# Patient Record
Sex: MALE | Race: WHITE | NOT HISPANIC OR LATINO | Employment: FULL TIME | URBAN - METROPOLITAN AREA
[De-identification: names, ages, dates, MRNs, and addresses within clinical notes are randomized per-mention and may not be internally consistent; named-entity substitution may affect disease eponyms.]

---

## 2017-03-13 RX ORDER — CLINDAMYCIN PHOSPHATE 10 MG/ML
SOLUTION TOPICAL 2 TIMES DAILY
OUTPATIENT
Start: 2017-03-13

## 2019-01-17 ENCOUNTER — TELEPHONE (OUTPATIENT)
Dept: ORTHOPEDICS | Facility: CLINIC | Age: 39
End: 2019-01-17

## 2019-01-22 DIAGNOSIS — M25.512 LEFT SHOULDER PAIN, UNSPECIFIED CHRONICITY: Primary | ICD-10-CM

## 2019-01-23 ENCOUNTER — OFFICE VISIT (OUTPATIENT)
Dept: ORTHOPEDICS | Facility: CLINIC | Age: 39
End: 2019-01-23
Payer: COMMERCIAL

## 2019-01-23 ENCOUNTER — HOSPITAL ENCOUNTER (OUTPATIENT)
Dept: RADIOLOGY | Facility: HOSPITAL | Age: 39
Discharge: HOME | End: 2019-01-23
Attending: ORTHOPAEDIC SURGERY
Payer: COMMERCIAL

## 2019-01-23 VITALS
HEART RATE: 65 BPM | SYSTOLIC BLOOD PRESSURE: 122 MMHG | DIASTOLIC BLOOD PRESSURE: 81 MMHG | BODY MASS INDEX: 25.67 KG/M2 | HEIGHT: 74 IN | WEIGHT: 200 LBS

## 2019-01-23 DIAGNOSIS — M25.512 LEFT SHOULDER PAIN, UNSPECIFIED CHRONICITY: ICD-10-CM

## 2019-01-23 DIAGNOSIS — S43.432A GLENOID LABRAL TEAR, LEFT, INITIAL ENCOUNTER: Primary | ICD-10-CM

## 2019-01-23 DIAGNOSIS — M25.512 LEFT SHOULDER PAIN, UNSPECIFIED CHRONICITY: Primary | ICD-10-CM

## 2019-01-23 PROCEDURE — 73030 XR SHOULDER TRAUMA 3 VIEW LEFT: ICD-10-PCS | Mod: 26,LT,, | Performed by: RADIOLOGY

## 2019-01-23 PROCEDURE — 99999 PR PBB SHADOW E&M-EST. PATIENT-LVL III: ICD-10-PCS | Mod: PBBFAC,,, | Performed by: ORTHOPAEDIC SURGERY

## 2019-01-23 PROCEDURE — 3008F BODY MASS INDEX DOCD: CPT | Mod: CPTII,S$GLB,, | Performed by: ORTHOPAEDIC SURGERY

## 2019-01-23 PROCEDURE — 3008F PR BODY MASS INDEX (BMI) DOCUMENTED: ICD-10-PCS | Mod: CPTII,S$GLB,, | Performed by: ORTHOPAEDIC SURGERY

## 2019-01-23 PROCEDURE — 99999 PR PBB SHADOW E&M-EST. PATIENT-LVL III: CPT | Mod: PBBFAC,,, | Performed by: ORTHOPAEDIC SURGERY

## 2019-01-23 PROCEDURE — 99203 PR OFFICE/OUTPT VISIT, NEW, LEVL III, 30-44 MIN: ICD-10-PCS | Mod: S$GLB,,, | Performed by: ORTHOPAEDIC SURGERY

## 2019-01-23 PROCEDURE — 73030 X-RAY EXAM OF SHOULDER: CPT | Mod: TC,PO,LT

## 2019-01-23 PROCEDURE — 73030 X-RAY EXAM OF SHOULDER: CPT | Mod: 26,LT,, | Performed by: RADIOLOGY

## 2019-01-23 PROCEDURE — 99203 OFFICE O/P NEW LOW 30 MIN: CPT | Mod: S$GLB,,, | Performed by: ORTHOPAEDIC SURGERY

## 2019-01-23 NOTE — PROGRESS NOTES
Past Medical History:   Diagnosis Date    Acne     GERD (gastroesophageal reflux disease)     Hypertension        Past Surgical History:   Procedure Laterality Date    BREAST BIOPSY      CERVICAL CONIZATION   W/ LASER      infertility surgery      SKIN SURGERY  1984    mass behind or in right EOC removed benign    TONSILLECTOMY         Current Outpatient Medications   Medication Sig    clindamycin (CLEOCIN T) 1 % Swab APPLY TO AFFECTED AREA TWICE A DAY    clindamycin (CLEOCIN T) 1 % Swab APPLY TO AFFECTED AREA TWICE A DAY     No current facility-administered medications for this visit.        Review of patient's allergies indicates:  No Known Allergies    Family History   Problem Relation Age of Onset    Heart disease Mother 55        CABG    Hyperlipidemia Mother     Hypertension Mother     Hyperlipidemia Father     Heart disease Father 63        stent    Heart disease Paternal Uncle     Cancer Maternal Grandmother     Diabetes Maternal Grandmother        Social History     Socioeconomic History    Marital status:      Spouse name: Not on file    Number of children: Not on file    Years of education: Not on file    Highest education level: Not on file   Social Needs    Financial resource strain: Not on file    Food insecurity - worry: Not on file    Food insecurity - inability: Not on file    Transportation needs - medical: Not on file    Transportation needs - non-medical: Not on file   Occupational History    Not on file   Tobacco Use    Smoking status: Never Smoker    Smokeless tobacco: Never Used   Substance and Sexual Activity    Alcohol use: Yes     Alcohol/week: 4.8 oz     Types: 4 Glasses of wine, 4 Cans of beer per week    Drug use: No    Sexual activity: Yes     Partners: Female   Other Topics Concern    Not on file   Social History Narrative    Not on file       Chief Complaint:   Chief Complaint   Patient presents with    Left Shoulder - Pain       History of  present illness:  This is a 38-year-old male seen for years of left shoulder pain.  I saw him back in 2013 for his right shoulder.  Patient states that he has had problems and had to modify activity for multiple years.  Thinks he hurt it doing some weight lifting.  Hurts with pushups and overhead lifts.  Pain with extension and rotation. Pain reaching across the body.  Pain is a 5/10.    Answers for HPI/ROS submitted by the patient on 1/22/2019   Arm pain  activity change: No  unexpected weight change: No  neck pain: No  hearing loss: No  rhinorrhea: No  trouble swallowing: No  eye discharge: No  visual disturbance: No  chest tightness: No  wheezing: No  chest pain: No  palpitations: No  blood in stool: No  constipation: No  vomiting: No  diarrhea: No  polydipsia: No  polyuria: No  difficulty urinating: No  urgency: No  hematuria: No  joint swelling: No  arthralgias: Yes  headaches: No  weakness: No  confusion: No  dysphoric mood: No  appetite change : No  sleep disturbance: No  IMMUNOCOMPROMISED: No  nervous/ anxious: No  rash: No  eye redness: No  eye pain: No  ear pain: No  tinnitus: No  sinus pressure : Yes  nosebleeds: No  enviro allergies: No  food allergies: No  cough: No  shortness of breath: No  sweating: No  frequency: No  nausea: No  dizziness: No  numbness: No  seizures: No  myalgia: Yes  back pain: Yes  Pain Chronicity: chronic  History of trauma: No  Onset: more than 1 year ago  Frequency: constantly  Progression since onset: gradually worsening  Injury mechanism: twisting  injury location: on the field  pain- numeric: 6/10  pain location: left shoulder  pain quality: aching, dull, sharp, generalized  Radiating Pain: Yes  If your pain is radiating, to what part of the body?: left arm  Aggravating factors: activity, bearing weight, exercise, extension, flexion, lifting, rotation  fever: No  inability to bear weight: Yes  itching: No  joint locking: No  limited range of motion: Yes  stiffness:  Yes  tingling: No  Treatments tried: exercise, movement  physical therapy: ineffective  Improvement on treatment: no relief        Physical Examination:    Vital Signs:    Vitals:    01/23/19 0759   BP: 122/81   Pulse: 65       Body mass index is 25.68 kg/m².    This a well-developed, well nourished patient in no acute distress.  They are alert and oriented and cooperative to examination.  Pt. walks without an antalgic gait.      Examination of the left shoulder shows no rashes or erythema. There are no masses, ecchymosis, or atrophy. The patient has full range of motion in forward flexion, external rotation, and internal rotation to the mid T-spine. The patient has moderately positive impingement signs.  Equivocal Whitley's test. - Speeds test. Nontender to palpation over a.c. joint. Normal stability anteriorly, posteriorly, and negative sulcus sign. Passive range of motion: Forward flexion of 180°, external rotation at 90° of 90°, internal rotation of 50°, and external rotation at 0° of 50°. 2+ radial pulse. Intact axillary, radial, median and ulnar sensation. 5 out of 5 resisted forward flexion, external rotation, and negative lift off test.    Examination of the right shoulder shows no rashes or erythema. There are no masses, ecchymosis, or atrophy. The patient has full range of motion in forward flexion, external rotation, and internal rotation to the mid T-spine. The patient has - impingement signs. - Whitley's test. - Speeds test. Nontender to palpation over a.c. joint. Normal stability anteriorly, posteriorly, and negative sulcus sign. Passive range of motion: Forward flexion of 180°, external rotation at 90° of 90°, internal rotation of 50°, and external rotation at 0° of 50°. 2+ radial pulse. Intact axillary, radial, median and ulnar sensation. 5 out of 5 resisted forward flexion, external rotation, and negative lift off test.      X-rays:  X-rays of the left shoulder ordered and reviewed which show no  atypical findings     Assessment::  Possible left labral tear    Plan:  I reviewed the findings with him today.  We agreed to get an MR arthrogram of his left shoulder look for labral tear.  He has had pain for years now that has not improved.  Follow up after the MRI is completed.    This note was created using [employer] voice recognition software that occasionally misinterpreted phrases or words.    Consult note is delivered via Epic messaging service.

## 2019-01-24 ENCOUNTER — PATIENT MESSAGE (OUTPATIENT)
Dept: ORTHOPEDICS | Facility: CLINIC | Age: 39
End: 2019-01-24

## 2019-01-29 DIAGNOSIS — M25.512 LEFT SHOULDER PAIN, UNSPECIFIED CHRONICITY: Primary | ICD-10-CM

## 2019-02-01 ENCOUNTER — HOSPITAL ENCOUNTER (OUTPATIENT)
Dept: RADIOLOGY | Facility: HOSPITAL | Age: 39
Discharge: HOME | End: 2019-02-01
Attending: ORTHOPAEDIC SURGERY
Payer: COMMERCIAL

## 2019-02-01 DIAGNOSIS — M25.512 LEFT SHOULDER PAIN, UNSPECIFIED CHRONICITY: ICD-10-CM

## 2019-02-01 PROCEDURE — 25000003 PHARM REV CODE 250

## 2019-02-01 PROCEDURE — 73040 CONTRAST X-RAY OF SHOULDER: CPT | Mod: 26,LT,, | Performed by: RADIOLOGY

## 2019-02-01 PROCEDURE — 23350 XR ARTHROGRAM SHOULDER LEFT WITH MRI TO FOLLOW: ICD-10-PCS | Mod: LT,,, | Performed by: RADIOLOGY

## 2019-02-01 PROCEDURE — 73222 MRI ARTHROGRAM SHOULDER WITH CONTRAST LEFT: ICD-10-PCS | Mod: 26,LT,, | Performed by: RADIOLOGY

## 2019-02-01 PROCEDURE — A9577 INJ MULTIHANCE: HCPCS

## 2019-02-01 PROCEDURE — 73040 CONTRAST X-RAY OF SHOULDER: CPT | Mod: TC,LT

## 2019-02-01 PROCEDURE — 73040 XR ARTHROGRAM SHOULDER LEFT WITH MRI TO FOLLOW: ICD-10-PCS | Mod: 26,LT,, | Performed by: RADIOLOGY

## 2019-02-01 PROCEDURE — 73222 MRI JOINT UPR EXTREM W/DYE: CPT | Mod: 26,LT,, | Performed by: RADIOLOGY

## 2019-02-01 PROCEDURE — 25500020 PHARM REV CODE 255

## 2019-02-01 PROCEDURE — 73222 MRI JOINT UPR EXTREM W/DYE: CPT | Mod: TC,LT

## 2019-02-01 PROCEDURE — 23350 INJECTION FOR SHOULDER X-RAY: CPT | Mod: LT,,, | Performed by: RADIOLOGY

## 2019-02-01 RX ORDER — LIDOCAINE HYDROCHLORIDE 10 MG/ML
INJECTION, SOLUTION EPIDURAL; INFILTRATION; INTRACAUDAL; PERINEURAL
Status: COMPLETED
Start: 2019-02-01 | End: 2019-02-01

## 2019-02-01 RX ADMIN — IOHEXOL 10 ML: 350 INJECTION, SOLUTION INTRAVENOUS at 01:02

## 2019-02-01 RX ADMIN — LIDOCAINE HYDROCHLORIDE 5 ML: 10 INJECTION, SOLUTION EPIDURAL; INFILTRATION; INTRACAUDAL; PERINEURAL at 01:02

## 2019-02-01 RX ADMIN — GADOBENATE DIMEGLUMINE 0.15 ML: 529 INJECTION, SOLUTION INTRAVENOUS at 01:02

## 2019-02-05 ENCOUNTER — TELEPHONE (OUTPATIENT)
Dept: ORTHOPEDICS | Facility: CLINIC | Age: 39
End: 2019-02-05

## 2019-02-05 NOTE — TELEPHONE ENCOUNTER
----- Message from Shiv Dunlap MD sent at 2/4/2019  7:39 AM CST -----  Results noted. Pt needs appt to discuss results and treatment options.

## 2019-02-06 ENCOUNTER — OFFICE VISIT (OUTPATIENT)
Dept: ORTHOPEDICS | Facility: CLINIC | Age: 39
End: 2019-02-06
Payer: COMMERCIAL

## 2019-02-06 VITALS
DIASTOLIC BLOOD PRESSURE: 79 MMHG | WEIGHT: 200 LBS | BODY MASS INDEX: 25.67 KG/M2 | SYSTOLIC BLOOD PRESSURE: 126 MMHG | HEART RATE: 55 BPM | HEIGHT: 74 IN

## 2019-02-06 DIAGNOSIS — S43.432D GLENOID LABRAL TEAR, LEFT, SUBSEQUENT ENCOUNTER: Primary | ICD-10-CM

## 2019-02-06 PROCEDURE — 99999 PR PBB SHADOW E&M-EST. PATIENT-LVL III: ICD-10-PCS | Mod: PBBFAC,,, | Performed by: ORTHOPAEDIC SURGERY

## 2019-02-06 PROCEDURE — 3008F BODY MASS INDEX DOCD: CPT | Mod: CPTII,S$GLB,, | Performed by: ORTHOPAEDIC SURGERY

## 2019-02-06 PROCEDURE — 99213 PR OFFICE/OUTPT VISIT, EST, LEVL III, 20-29 MIN: ICD-10-PCS | Mod: S$GLB,,, | Performed by: ORTHOPAEDIC SURGERY

## 2019-02-06 PROCEDURE — 99999 PR PBB SHADOW E&M-EST. PATIENT-LVL III: CPT | Mod: PBBFAC,,, | Performed by: ORTHOPAEDIC SURGERY

## 2019-02-06 PROCEDURE — 3008F PR BODY MASS INDEX (BMI) DOCUMENTED: ICD-10-PCS | Mod: CPTII,S$GLB,, | Performed by: ORTHOPAEDIC SURGERY

## 2019-02-06 PROCEDURE — 99213 OFFICE O/P EST LOW 20 MIN: CPT | Mod: S$GLB,,, | Performed by: ORTHOPAEDIC SURGERY

## 2019-02-07 PROBLEM — S43.432D GLENOID LABRAL TEAR, LEFT, SUBSEQUENT ENCOUNTER: Status: ACTIVE | Noted: 2019-02-07

## 2019-02-07 NOTE — PROGRESS NOTES
Past Medical History:   Diagnosis Date    Acne     GERD (gastroesophageal reflux disease)     Hypertension        Past Surgical History:   Procedure Laterality Date    SKIN SURGERY  1984    mass behind or in right EOC removed benign    TONSILLECTOMY         Current Outpatient Medications   Medication Sig    clindamycin (CLEOCIN T) 1 % Swab APPLY TO AFFECTED AREA TWICE A DAY    clindamycin (CLEOCIN T) 1 % Swab APPLY TO AFFECTED AREA TWICE A DAY     No current facility-administered medications for this visit.        Review of patient's allergies indicates:  No Known Allergies    Family History   Problem Relation Age of Onset    Heart disease Mother 55        CABG    Hyperlipidemia Mother     Hypertension Mother     Hyperlipidemia Father     Heart disease Father 63        stent    Heart disease Paternal Uncle     Cancer Maternal Grandmother     Diabetes Maternal Grandmother        Social History     Socioeconomic History    Marital status:      Spouse name: Not on file    Number of children: Not on file    Years of education: Not on file    Highest education level: Not on file   Social Needs    Financial resource strain: Not on file    Food insecurity - worry: Not on file    Food insecurity - inability: Not on file    Transportation needs - medical: Not on file    Transportation needs - non-medical: Not on file   Occupational History    Not on file   Tobacco Use    Smoking status: Never Smoker    Smokeless tobacco: Never Used   Substance and Sexual Activity    Alcohol use: Yes     Alcohol/week: 4.8 oz     Types: 4 Glasses of wine, 4 Cans of beer per week    Drug use: No    Sexual activity: Yes     Partners: Female   Other Topics Concern    Not on file   Social History Narrative    Not on file       Chief Complaint:   Chief Complaint   Patient presents with    Shoulder Pain     L shoulder mri/arthrogram results        History of present illness:  This is a 38-year-old male seen  for years of left shoulder pain.  I saw him back in 2013 for his right shoulder.  Patient states that he has had problems and had to modify activity for multiple years.  Thinks he hurt it doing some weight lifting.  Hurts with pushups and overhead lifts.  Pain with extension and rotation. Pain reaching across the body.  Pain is a 5/10.  MRI shows large superior labral tear    Answers for HPI/ROS submitted by the patient on 1/22/2019   Arm pain  activity change: No  unexpected weight change: No  neck pain: No  hearing loss: No  rhinorrhea: No  trouble swallowing: No  eye discharge: No  visual disturbance: No  chest tightness: No  wheezing: No  chest pain: No  palpitations: No  blood in stool: No  constipation: No  vomiting: No  diarrhea: No  polydipsia: No  polyuria: No  difficulty urinating: No  urgency: No  hematuria: No  joint swelling: No  arthralgias: Yes  headaches: No  weakness: No  confusion: No  dysphoric mood: No  appetite change : No  sleep disturbance: No  IMMUNOCOMPROMISED: No  nervous/ anxious: No  rash: No  eye redness: No  eye pain: No  ear pain: No  tinnitus: No  sinus pressure : Yes  nosebleeds: No  enviro allergies: No  food allergies: No  cough: No  shortness of breath: No  sweating: No  frequency: No  nausea: No  dizziness: No  numbness: No  seizures: No  myalgia: Yes  back pain: Yes  Pain Chronicity: chronic  History of trauma: No  Onset: more than 1 year ago  Frequency: constantly  Progression since onset: gradually worsening  Injury mechanism: twisting  injury location: on the field  pain- numeric: 6/10  pain location: left shoulder  pain quality: aching, dull, sharp, generalized  Radiating Pain: Yes  If your pain is radiating, to what part of the body?: left arm  Aggravating factors: activity, bearing weight, exercise, extension, flexion, lifting, rotation  fever: No  inability to bear weight: Yes  itching: No  joint locking: No  limited range of motion: Yes  stiffness: Yes  tingling:  No  Treatments tried: exercise, movement  physical therapy: ineffective  Improvement on treatment: no relief        Physical Examination:    Vital Signs:    Vitals:    02/06/19 1552   BP: 126/79   Pulse: (!) 55       Body mass index is 25.68 kg/m².    This a well-developed, well nourished patient in no acute distress.  They are alert and oriented and cooperative to examination.  Pt. walks without an antalgic gait.      Examination of the left shoulder shows no rashes or erythema. There are no masses, ecchymosis, or atrophy. The patient has full range of motion in forward flexion, external rotation, and internal rotation to the mid T-spine. The patient has moderately positive impingement signs.  Equivocal Mondovi's test. - Speeds test. Nontender to palpation over a.c. joint. Normal stability anteriorly, posteriorly, and negative sulcus sign. Passive range of motion: Forward flexion of 180°, external rotation at 90° of 90°, internal rotation of 50°, and external rotation at 0° of 50°. 2+ radial pulse. Intact axillary, radial, median and ulnar sensation. 5 out of 5 resisted forward flexion, external rotation, and negative lift off test.      X-rays:  X-rays of the left shoulder reviewed which show no atypical findings    MR arthrogram of the left shoulder:  1. There is a small focus of chondrolabral separation in the anterior labrum at the 9:00 position..  There is a tear of the anterosuperior, posterosuperior, and posteroinferior labrum from the 11 to 5 o'clock position.  There are 2 paralabral cyst posteriorly measuring 4 and 5 mm respectively.  2. Mild insertional tendinosis infraspinatus and supraspinatus.    Assessment::  Large left superior labral tear    Plan:  I reviewed the findings with him today.  We talked about treatment options.  I do not think that conservative options are going to be overly successful.  We talked about surgical treatment in the recovery.  Patient really does not want surgery at this  time.  He is able to work out kind of around certain things.  If he is able to get by and continue without significant dysfunction or pain, we will continue to monitor it.  If not he will follow up for surgical scheduling.    This note was created using [employer] voice recognition software that occasionally misinterpreted phrases or words.    Consult note is delivered via Epic messaging service.

## 2019-02-11 PROBLEM — S61.419A HAND LACERATION: Status: ACTIVE | Noted: 2019-02-11

## 2019-02-11 PROBLEM — S61.411A LACERATION OF RIGHT HAND WITH COMPLICATION: Status: ACTIVE | Noted: 2019-02-11

## 2019-02-12 PROBLEM — S66.526A: Status: ACTIVE | Noted: 2019-02-12

## 2019-02-12 PROBLEM — S64.00XA: Status: ACTIVE | Noted: 2019-02-12

## 2019-02-12 PROBLEM — S55.012A: Status: ACTIVE | Noted: 2019-02-12

## 2019-02-16 PROBLEM — S66.822A: Status: ACTIVE | Noted: 2019-02-16

## 2019-02-16 PROBLEM — S61.511A LACERATION OF RIGHT WRIST: Status: ACTIVE | Noted: 2019-02-16

## 2019-02-22 DIAGNOSIS — M79.643 HAND PAIN: Primary | ICD-10-CM

## 2019-03-13 ENCOUNTER — CLINICAL SUPPORT (OUTPATIENT)
Dept: REHABILITATION | Facility: HOSPITAL | Age: 39
End: 2019-03-13
Attending: CLINIC/CENTER
Payer: COMMERCIAL

## 2019-03-13 DIAGNOSIS — M25.60 RANGE OF MOTION DEFICIT: ICD-10-CM

## 2019-03-13 PROCEDURE — 97110 THERAPEUTIC EXERCISES: CPT

## 2019-03-13 NOTE — PATIENT INSTRUCTIONS
"[facility] - OCCUPATIONAL THERAPY  HOME EXERCISE PROGRAM         Complete the following exercises with 10 repetitions each, 4 to 6 x/day.       AROM: Isolated MCP Flexion / Extension ("Wave")   Bend only your large, bottom knuckles. Hold 3 seconds. Keep the tips of your fingers straight. Straighten fingers.    AROM: Isolated IPJ Flexion / Extension ("Hook")  Bend only your middle and end knuckles. Hold 3 seconds.   Straighten your fingers.     AROM: MCP and PIP Flexion / Extension ("Straight Fist")  Bend your bottom and middle knuckles, keeping the tips of your fingers straight. Try to touch the pads of your fingers on your palm. Hold 3 seconds. Straighten your fingers.     AROM: Composite Flexion / Extension ("Full Fist")  Bend every joint in your hand into a fist. Hold 3 seconds. Straighten your fingers.                     AROM: Wrist Flexion / Extension  Make a fist, then bend your wrist forward then back as far as possible.         AROM: Wrist Radial / Ulnar Deviation   Make a fist then bend your wrist toward your body, then away.         Copyright © I. All rights reserved.     Therapist: LEONEL Olvera, ARNAV     "

## 2019-03-13 NOTE — PROGRESS NOTES
"              HAND THERAPY/OCCUPATIONAL THERAPY PROGRESS REPORT     Date:  2019 Clinic Number: [id number]  Patient: Ashu Thomas        : 1980      Age: 38 y.o.     Sex: male                       Hand dominance: right  Referring Physician: Dr. Ferreira  RTD:  ?  Diagnosis: Complete laceration of right ulnar wrist with ulnar nerve and artery laceration and FCU, FDS, and FDP to RF and SF  Precautions:  Splint wear/care; infection control; no functional use of    Date of Injury: 19  Date of Surgery: 19 - right hand I&D with ulnar artery/laceration repair; 19 - right hand tendon/nerve repair                S/P: 4W 0D s/p I&D with ulnar artery/laceration repair; 3W 2D s/p tendon, nerve repair  Surgical Procedure: Right hand I&D with ulnar artery/laceration repair on 19; Right hand tendon, nerve repair on 19    Visit #: [id number]   Time In: 12:00 PM  Time Out: 1:00 PM    Occupation:  for [employer]  Work Status: Light duty    SUBJECTIVE     Pt reports he is still having a "constant tingle" in ulnar hand. He reports his hand aches "every now and then." He reports his swelling has been fluctuating.    Functional Pain Scale Rating 0-10: 2/10  Location: ulnar hand and volar central palm  Description: Aching and Tingling  Activities which increase pain: "If I move the wrong way"  Activities which decrease pain: ice and heating pad, ibuprofen 600mg x twice daily    OBJECTIVE     Today's Treatment: Therapeutic exercises x 20 min, Manual therapy x 20 min, Moist heat x 15 min    Current Treatment: manual therapy/joint mobilization, Therapeutic exercises, Sensory re-education, Desensitization, Scar management, Edema management, Joint protection and Orthotic fitting/training    Sensation: Ulnar Nerve Impaired     [place] Chris Monofilament Test:   6.65 (Deep pressure only) - Ulnar distribution of palm and small finger  4.56 (Loss of protective sensation) - Radial and ulnar " halves of proximal and middle phalanges of ring finger  4.31 (Diminished Protective sensation) - MCP pad of ring finger  3.61(Diminished light touch) - Radial and ulnar halves of distal phalanx of ring finger   2.83 (Normal) - throughout median nerve distribution    Scar Assessment: Hypersensitivity in central portion of scar. Incision sites are almost fully healed with small open portion remaining in center. Scabbing tissue observed around edges of scar site.    Edema: Circumferential measurements: as follows: (compared to 2/19/19)   Index Middle Ring Small   Proximal Phalanx 7.6 cm (0) 7.4 cm (0) 7.0 cm (-3mm) 6.6 cm (-1mm)     MCP's 23.5 cm (-4mm)   Distal Palmar Crease 24.6 cm (0)   Proximal Wrist Crease  20.2 cm (+2mm)     Range of Motion: right Passive flexion (compared to 2/19/19)  (Ext/Flex) Index Middle Ring Small   MCP Jt 86° 87° 82° 86°   PIP Jt 84° 87° 83° 85°   DIP Jt 59° 61° 60° 66°   ACOSTA TBA ° ° °   Total passive flexion 229° 235° 225° 237°     (Ext/Flex) Thumb   MCP Jt WNL within splint   IP Jt    Opposition    Palmar Abd    Radial Abd      Elbow Ext:  WNL° Flex:  WNL°   Wrist Ext:  TBA° Flex:  TBA°   Wrist RD:  TBA° UD:  TBA°   Forearm Pron:  WNL° Sup:  WNL°       AROM is as follows:    Range of Motion: Active Ext/Flex 3/13/2019 (in degrees)  (Ext/Flex) Index Middle Ring Small   MCP Jt 30/65 30/65 20/47 8/30   PIP Jt 26/55 58/65 73/77 62/77   DIP Jt 0/30 22/33 18/30 32/42   ACOSTA 64 53 43 47     Thumb MP ext/flex 0/50  Thumb IP ext/flex 0/45    Elbow Ext:  WNL Flex:  WNL   Wrist Ext:  30 Flex:  20   Wrist RD:  15 UD:  10   Forearm Pron:  WNL Sup:  WNL       CMS Impairment/Limitation/Restriction for FOTO Hand Survey    Therapist reviewed FOTO scores for Ashu Thomas on 3/13/2019.   FOTO documents entered into EPIC - see Media section.    Limitation Score: 96%  Category: Carrying    Current : CM = at least 80% but < 100% impaired limited or restricted  Goal: CK = at least 40% but < 60% impaired,  limited or restricted             Ashu received the following supervised modalities after being cleared for contradictions for 15 minutes:   -Patient received  moist heat to right hand for 15 minutes to increase blood flow, circulation, pain management and for tissue elasticity prior to therex.     Ashu received manual therapy for 5 minutes including:  RM and STM to right hand/wrist    Ashu received therapeutic exercises for 25 minutes including:  -PROM as follows: jt blocking, thumb opposition, finger flexion, finger extension with MPs at 90, wrist flex and wrist ext to neutral only  -gentle AROM as follows: TGEs, thumb opposition, wrist flex/ext with fingers flexed and RD/UD with fingers flexed x 10 reps    Adjusted orthosis with MPs at 80 degrees and IP's O degrees for safe position in volar orthotic.       Hand Strength:    / pinch strength testing N/A at present.  TBA at a later date.    Functional Limitations: Patient presents with the following functional Limitations:   Self Care / ADL: managing one handed  Home/Work Activities: working light duty  Leisure: working out    Home Exercises Provided: Postural exercises, passive digit flexion (ind and composite) with active extension with MCPs flexed; Edema control techniques, orthotic fabrication/fit/training, instruction in use, wear and care precautions for orthotic and patient reported good understanding of above 10 reps each, 4-6 x day.  3/11/19: Patient provided with right large isotoner glove to be worn daily/nightly for edema control. Patient purchased vibration massager to be used for sensory re-education and edema management for 5 min 1-2 x daily. Issued AROM exercises today.    Education provided re: tendon, nerve, artery repair protocols, healing time frames  Ashu verbalized good understanding of education provided.   No spiritual or educational barriers to learning provided    ASSESSMENT     Patient is now 4W 2 D s/p I&D with ulnar  artery/laceration repair and 3W 4D s/p tendon, nerve repair. Mr. Thomas is demonstrating decreased stiffness with passive flexion of digits 2-5. Measured AROM today for his hand and wrist. Pt began gentle TGEs today following his PROM.  Mr. Thomas has been compliant with HEP and is motivated to work with therapy in order to regain full functional use of his right hand.     Treatment Diagnosis/ Problem List RUE Decreased ROM, Decreased  strength, Decreased pinch strength, Decreased functional hand use, Increased pain, Edema, Joint Stiffness, Scar Adhesion potential and Diminished/Impaired Sensation    Previous STG's:  NOT MET  New Short Term Goals: Increase ROM 3-5 degrees to increase functional hand use for ADLs/work/leisure activities, Increase  strength 3-5 lbs. to improve functional grasp for ADLs/work/leisure activities, Increase pinch 1-3 psi's to increase IND wiht button and FM Coordination., Decrease edema .2-.3 mm to increase joint mobility/flexibility for hand use. (AROM and strength TBA as appropriate)    Pt prognosis is Excellent. Pt will continue to benefit from skilled outpatient hand therapy to address the deficits listed in the problem list, provide pt education and to maximize pt's level of independence in the home and community environment.      PLAN      Will continue/progress with established Plan of Care towards OT goals as orders are received 2 x week for 6-8 more weeks.  Thank you!    LEONEL Olvera, CHT  3/13/2019    I certify the need for these services furnished under this plan of treatment and while under my care.     ____________________________________                         __________________  Physician/Referring Practitioner                                               Date of Signature

## 2019-03-18 ENCOUNTER — CLINICAL SUPPORT (OUTPATIENT)
Dept: REHABILITATION | Facility: HOSPITAL | Age: 39
End: 2019-03-18
Attending: CLINIC/CENTER
Payer: COMMERCIAL

## 2019-03-18 DIAGNOSIS — M25.60 RANGE OF MOTION DEFICIT: ICD-10-CM

## 2019-03-18 PROCEDURE — 97110 THERAPEUTIC EXERCISES: CPT

## 2019-03-18 PROCEDURE — 97022 WHIRLPOOL THERAPY: CPT

## 2019-03-18 NOTE — PROGRESS NOTES
"              HAND THERAPY/OCCUPATIONAL THERAPY PROGRESS REPORT     Date:  2019 Clinic Number: [id number]  Patient: Ashu Thomas        : 1980      Age: 38 y.o.     Sex: male                       Hand dominance: right  Referring Physician: Dr. Ferreira  RTD:  ?  Diagnosis: Complete laceration of right ulnar wrist with ulnar nerve and artery laceration and FCU, FDS, and FDP to RF and SF  Precautions:  Splint wear/care; infection control; no functional use of    Date of Injury: 19  Date of Surgery: 19 - right hand I&D with ulnar artery/laceration repair; 19 - right hand tendon/nerve repair                S/P: 4W 0D s/p I&D with ulnar artery/laceration repair; 3W 2D s/p tendon, nerve repair  Surgical Procedure: Right hand I&D with ulnar artery/laceration repair on 19; Right hand tendon, nerve repair on 19    Visit #: 10  Time In: 11:00 AM  Time Out: 11:45 AM  Charges: ziyad, 2 TE    Occupation:  for [employer]  Work Status: Light duty    SUBJECTIVE     Pt reports he is still having a "constant tingle" in ulnar hand. He reports his hand aches "every now and then." He reports his swelling has been fluctuating.    Functional Pain Scale Rating 0-10: 2/10  Location: ulnar hand and volar central palm  Description: Aching and Tingling  Activities which increase pain: "If I move the wrong way"  Activities which decrease pain: ice and heating pad, ibuprofen 600mg x twice daily    OBJECTIVE     Today's Treatment: Therapeutic exercises x 35 min,  fluido x 15 min    Current Treatment: manual therapy/joint mobilization, Therapeutic exercises, Sensory re-education, Desensitization, Scar management, Edema management, Joint protection and Orthotic fitting/training    Sensation: Ulnar Nerve Impaired     [place] Chris Monofilament Test:   6.65 (Deep pressure only) - Ulnar distribution of palm and small finger  4.56 (Loss of protective sensation) - Radial and ulnar halves " of proximal and middle phalanges of ring finger  4.31 (Diminished Protective sensation) - MCP pad of ring finger  3.61(Diminished light touch) - Radial and ulnar halves of distal phalanx of ring finger   2.83 (Normal) - throughout median nerve distribution    Scar Assessment: Hypersensitivity in central portion of scar. Incision sites are almost fully healed with small open portion remaining in center. Scabbing tissue observed around edges of scar site.    Edema: Circumferential measurements: as follows: (compared to 2/19/19)   Index Middle Ring Small   Proximal Phalanx 7.6 cm (0) 7.4 cm (0) 7.0 cm (-3mm) 6.6 cm (-1mm)     MCP's 23.5 cm (-4mm)   Distal Palmar Crease 24.6 cm (0)   Proximal Wrist Crease  20.2 cm (+2mm)     Range of Motion: right Passive flexion (compared to 2/19/19)  (Ext/Flex) Index Middle Ring Small   MCP Jt 86° 87° 82° 86°   PIP Jt 84° 87° 83° 85°   DIP Jt 59° 61° 60° 66°   ACOSTA TBA ° ° °   Total passive flexion 229° 235° 225° 237°     (Ext/Flex) Thumb   MCP Jt WNL within splint   IP Jt    Opposition    Palmar Abd    Radial Abd      Elbow Ext:  WNL° Flex:  WNL°   Wrist Ext:  TBA° Flex:  TBA°   Wrist RD:  TBA° UD:  TBA°   Forearm Pron:  WNL° Sup:  WNL°       AROM is as follows:    Range of Motion: Active Ext/Flex 3/13/2019 (in degrees)  (Ext/Flex) Index Middle Ring Small   MCP Jt 30/65 30/65 20/47 8/30   PIP Jt 26/55 58/65 73/77 62/77   DIP Jt 0/30 22/33 18/30 32/42   ACOSTA 64 53 43 47     Thumb MP ext/flex 0/50  Thumb IP ext/flex 0/45    Elbow Ext:  WNL Flex:  WNL   Wrist Ext:  30 Flex:  20   Wrist RD:  15 UD:  10   Forearm Pron:  WNL Sup:  WNL         Ashu received the following supervised modalities after being cleared for contradictions for 15 minutes:   -Patient received  fluido for 15 minutes to increase blood flow, circulation, pain management and for tissue elasticity prior to therex.     Ashu received manual therapy for 5 minutes including:  RM and STM to right hand/wrist    Ashu vargas  therapeutic exercises for 25 minutes including:  -PROM as follows: jt blocking, thumb opposition, finger flexion, finger extension with MPs at 90, wrist flex and wrist ext to neutral only  -gentle AROM as follows: TGEs, thumb opposition, wrist flex/ext with fingers flexed and RD/UD with fingers flexed 2 x 10 reps  -towel scrunches x 5 reps          Hand Strength:    / pinch strength testing N/A at present.  TBA at a later date.    Functional Limitations: Patient presents with the following functional Limitations:   Self Care / ADL: managing one handed  Home/Work Activities: working light duty  Leisure: working out    Home Exercises Provided: Postural exercises, passive digit flexion (ind and composite) with active extension with MCPs flexed; Edema control techniques, orthotic fabrication/fit/training, instruction in use, wear and care precautions for orthotic and patient reported good understanding of above 10 reps each, 4-6 x day.  3/11/19: Patient provided with right large isotoner glove to be worn daily/nightly for edema control. Patient purchased vibration massager to be used for sensory re-education and edema management for 5 min 1-2 x daily. Issued AROM exercises today.    Education provided re: tendon, nerve, artery repair protocols, healing time frames  Ashu verbalized good understanding of education provided.   No spiritual or educational barriers to learning provided    ASSESSMENT     Patient is now 5 W 0 D s/p I&D with ulnar artery/laceration repair and 4 W 2 D s/p tendon, nerve repair. Continued A/AA/PROM exercises with his right hand and wrist. Advanced with towel scrunches and pt tolerated all activities well.     Treatment Diagnosis/ Problem List RUE Decreased ROM, Decreased  strength, Decreased pinch strength, Decreased functional hand use, Increased pain, Edema, Joint Stiffness, Scar Adhesion potential and Diminished/Impaired Sensation    Previous STG's:  NOT MET  New Short Term Goals:  Increase ROM 3-5 degrees to increase functional hand use for ADLs/work/leisure activities, Increase  strength 3-5 lbs. to improve functional grasp for ADLs/work/leisure activities, Increase pinch 1-3 psi's to increase IND wiht button and FM Coordination., Decrease edema .2-.3 mm to increase joint mobility/flexibility for hand use. (AROM and strength TBA as appropriate)    Pt prognosis is Excellent. Pt will continue to benefit from skilled outpatient hand therapy to address the deficits listed in the problem list, provide pt education and to maximize pt's level of independence in the home and community environment.      PLAN      Will continue/progress with established Plan of Care towards OT goals as orders are received 2 x week for 6-8 more weeks.  Thank you!    LEONEL Olvera, CHT  3/18/2019    I certify the need for these services furnished under this plan of treatment and while under my care.     ____________________________________                         __________________  Physician/Referring Practitioner                                               Date of Signature

## 2019-03-21 ENCOUNTER — CLINICAL SUPPORT (OUTPATIENT)
Dept: REHABILITATION | Facility: HOSPITAL | Age: 39
End: 2019-03-21
Attending: CLINIC/CENTER
Payer: COMMERCIAL

## 2019-03-21 DIAGNOSIS — M25.60 RANGE OF MOTION DEFICIT: ICD-10-CM

## 2019-03-21 PROCEDURE — 97110 THERAPEUTIC EXERCISES: CPT

## 2019-03-21 PROCEDURE — 97022 WHIRLPOOL THERAPY: CPT

## 2019-03-21 NOTE — PROGRESS NOTES
"              HAND THERAPY/OCCUPATIONAL THERAPY PROGRESS REPORT     Date:  2019 Clinic Number: [id number]  Patient: Ashu Thomas        : 1980      Age: 38 y.o.     Sex: male                       Hand dominance: right  Referring Physician: Dr. Ferreira  RTD:  ?  Diagnosis: Complete laceration of right ulnar wrist with ulnar nerve and artery laceration and FCU, FDS, and FDP to RF and SF  Precautions:  Splint wear/care; infection control; no functional use of    Date of Injury: 19  Date of Surgery: 19 - right hand I&D with ulnar artery/laceration repair; 19 - right hand tendon/nerve repair                S/P: 4W 0D s/p I&D with ulnar artery/laceration repair; 3W 2D s/p tendon, nerve repair  Surgical Procedure: Right hand I&D with ulnar artery/laceration repair on 19; Right hand tendon, nerve repair on 19    Visit #: 11  Time In: 10:10 AM  Time Out: 11:00 AM  Billable Time: 45 minutes  Charges: fluido, 2 TE    Occupation:  for [employer]  Work Status: Light duty    SUBJECTIVE     Pt reports he is still having a "constant tingle" in ulnar hand. He reports his hand aches "every now and then." He reports his swelling has been decreased    Functional Pain Scale Rating 0-10: 2/10  Location: ulnar hand and volar central palm  Description: Aching and Tingling  Activities which increase pain: "If I move the wrong way"  Activities which decrease pain: ice and heating pad, ibuprofen 600mg x twice daily    OBJECTIVE     Today's Treatment: Therapeutic exercises x 35 min,  fluido x 15 min    Current Treatment: manual therapy/joint mobilization, Therapeutic exercises, Sensory re-education, Desensitization, Scar management, Edema management, Joint protection and Orthotic fitting/training    Sensation: Ulnar Nerve Impaired     [place] Chris Monofilament Test:   6.65 (Deep pressure only) - Ulnar distribution of palm and small finger  4.56 (Loss of protective sensation) - " Radial and ulnar halves of proximal and middle phalanges of ring finger  4.31 (Diminished Protective sensation) - MCP pad of ring finger  3.61(Diminished light touch) - Radial and ulnar halves of distal phalanx of ring finger   2.83 (Normal) - throughout median nerve distribution    Scar Assessment: Hypersensitivity in central portion of scar. Incision sites are almost fully healed with small open portion remaining in center. Scabbing tissue observed around edges of scar site.    Edema: Circumferential measurements: as follows: (compared to 2/19/19)   Index Middle Ring Small   Proximal Phalanx 7.6 cm (0) 7.4 cm (0) 7.0 cm (-3mm) 6.6 cm (-1mm)     MCP's 23.5 cm (-4mm)   Distal Palmar Crease 24.6 cm (0)   Proximal Wrist Crease  20.2 cm (+2mm)     Range of Motion: right Passive flexion (compared to 2/19/19)  (Ext/Flex) Index Middle Ring Small   MCP Jt 86° 87° 82° 86°   PIP Jt 84° 87° 83° 85°   DIP Jt 59° 61° 60° 66°   ACOSTA TBA ° ° °   Total passive flexion 229° 235° 225° 237°     (Ext/Flex) Thumb   MCP Jt WNL within splint   IP Jt    Opposition    Palmar Abd    Radial Abd      Elbow Ext:  WNL° Flex:  WNL°   Wrist Ext:  TBA° Flex:  TBA°   Wrist RD:  TBA° UD:  TBA°   Forearm Pron:  WNL° Sup:  WNL°       AROM is as follows:    Range of Motion: Active Ext/Flex 3/13/2019 (in degrees)  (Ext/Flex) Index Middle Ring Small   MCP Jt 30/65 30/65 20/47 8/30   PIP Jt 26/55 58/65 73/77 62/77   DIP Jt 0/30 22/33 18/30 32/42   ACOSTA 64 53 43 47     Thumb MP ext/flex 0/50  Thumb IP ext/flex 0/45    Elbow Ext:  WNL Flex:  WNL   Wrist Ext:  30 Flex:  20   Wrist RD:  15 UD:  10   Forearm Pron:  WNL Sup:  WNL         Ashu received the following supervised modalities after being cleared for contradictions for 15 minutes:   -Patient received  fluido for 15 minutes to increase blood flow, circulation, pain management and for tissue elasticity prior to therex.     Ashu received manual therapy for 5 minutes including:  RM and STM to right  hand/wrist    Ashu received therapeutic exercises for 30 minutes including:  -PROM as follows: jt blocking, thumb opposition, finger flexion, finger extension with MPs at 90, wrist flex and wrist ext to neutral only  -gentle AROM as follows: TGEs, thumb opposition, wrist flex/ext with fingers flexed and RD/UD with fingers flexed, finger ext, abd/add  2 x 15 reps  -towel scrunches x 10 reps  - medium dowel wrist 3 ways 2 x 15 reps  -Dextercizer x 3'        Hand Strength:    / pinch strength testing N/A at present.  TBA at a later date.    Functional Limitations: Patient presents with the following functional Limitations:   Self Care / ADL: managing one handed  Home/Work Activities: working light duty  Leisure: working out    Home Exercises Provided: Postural exercises, passive digit flexion (ind and composite) with active extension with MCPs flexed; Edema control techniques, orthotic fabrication/fit/training, instruction in use, wear and care precautions for orthotic and patient reported good understanding of above 10 reps each, 4-6 x day.  3/11/19: Patient provided with right large isotoner glove to be worn daily/nightly for edema control. Patient purchased vibration massager to be used for sensory re-education and edema management for 5 min 1-2 x daily. Issued AROM exercises today.  3/21/2019: Issued finger ext and abd/add ex today for HEP    Education provided re: tendon, nerve, artery repair protocols, healing time frames  Ashu verbalized good understanding of education provided.   No spiritual or educational barriers to learning provided    ASSESSMENT     Patient is now 5 W 0 D s/p I&D with ulnar artery/laceration repair and 4 W 2 D s/p tendon, nerve repair. Continued A/AA/PROM exercises with his right hand and wrist. Advanced with light activities and pt tolerated all activities well.     Treatment Diagnosis/ Problem List RUE Decreased ROM, Decreased  strength, Decreased pinch strength, Decreased  functional hand use, Increased pain, Edema, Joint Stiffness, Scar Adhesion potential and Diminished/Impaired Sensation    Previous STG's:  NOT MET  New Short Term Goals: Increase ROM 3-5 degrees to increase functional hand use for ADLs/work/leisure activities, Increase  strength 3-5 lbs. to improve functional grasp for ADLs/work/leisure activities, Increase pinch 1-3 psi's to increase IND wiht button and FM Coordination., Decrease edema .2-.3 mm to increase joint mobility/flexibility for hand use. (AROM and strength TBA as appropriate)    Pt prognosis is Excellent. Pt will continue to benefit from skilled outpatient hand therapy to address the deficits listed in the problem list, provide pt education and to maximize pt's level of independence in the home and community environment.      PLAN: Re-assess next visit      Will continue/progress with established Plan of Care towards OT goals as orders are received 2 x week for 6-8 more weeks.  Thank you!    LEONEL Olvera, T  3/21/2019    I certify the need for these services furnished under this plan of treatment and while under my care.     ____________________________________                         __________________  Physician/Referring Practitioner                                               Date of Signature

## 2019-03-21 NOTE — PATIENT INSTRUCTIONS
"[facility] - OCCUPATIONAL THERAPY  HOME EXERCISE PROGRAM       Complete the following exercises with 10 repetitions each, 4- 6 x/day.     ARAROM: Isolated MCP Flexion / Extension ("Wave")   Bend only your large, bottom knuckles. Hold 3 seconds. Keep the tips of your fingers straight. Straighten fingers.    AROM: Isolated IPJ Flexion / Extension ("Hook")  Bend only your middle and end knuckles. Hold 3 seconds.   Straighten your fingers.     AROM: MCP and PIP Flexion / Extension ("Straight Fist")  Bend your bottom and middle knuckles, keeping the tips of your fingers straight. Try to touch the pads of your fingers on your palm. Hold 3 seconds. Straighten your fingers.     AROM: Composite Flexion / Extension ("Full Fist")  Bend every joint in your hand into a fist. Hold 3 seconds. Straighten your fingers.     AROM: Composte Extension ("Finger Lifts")  Lift your finger off of the table one at a time. Hold 3 seconds. Relax your finger.    AROM: Abduction / Adduction  With hand flat on table, spread all fingers apart, then bring them together as close as possible.    Copyright © I. All rights reserved.     Therapist: LEONEL Olvera CHT    "

## 2019-03-25 ENCOUNTER — CLINICAL SUPPORT (OUTPATIENT)
Dept: REHABILITATION | Facility: HOSPITAL | Age: 39
End: 2019-03-25
Attending: CLINIC/CENTER
Payer: COMMERCIAL

## 2019-03-25 DIAGNOSIS — M25.60 RANGE OF MOTION DEFICIT: ICD-10-CM

## 2019-03-25 PROCEDURE — 97110 THERAPEUTIC EXERCISES: CPT

## 2019-03-25 PROCEDURE — 97022 WHIRLPOOL THERAPY: CPT

## 2019-03-25 NOTE — PROGRESS NOTES
"              HAND THERAPY/OCCUPATIONAL THERAPY PROGRESS REPORT     Date:  2019 Clinic Number: [id number]  Patient: Ashu Thomas        : 1980      Age: 38 y.o.     Sex: male                       Hand dominance: right  Referring Physician: Dr. Ferreira  RTD:  ?  Diagnosis: Complete laceration of right ulnar wrist with ulnar nerve and artery laceration and FCU, FDS, and FDP to RF and SF  Precautions:  Splint wear/care; infection control; no functional use of    Date of Injury: 19  Date of Surgery: 19 - right hand I&D with ulnar artery/laceration repair; 19 - right hand tendon/nerve repair                S/P: 4W 0D s/p I&D with ulnar artery/laceration repair; 3W 2D s/p tendon, nerve repair  Surgical Procedure: Right hand I&D with ulnar artery/laceration repair on 19; Right hand tendon, nerve repair on 19    Visit #: 12  Time In: 2:30 PM  Time Out: 3:15 PM  Billable Time: 45 minutes  Charges: fluido, 2 TE    Occupation:  for [employer]  Work Status: Light duty    SUBJECTIVE     Pt reports he is still having a "constant tingle" in ulnar hand. He reports his hand aches "every now and then." He reports his swelling has been decreased    Functional Pain Scale Rating 0-10: 2/10  Location: ulnar hand and volar central palm  Description: Aching and Tingling  Activities which increase pain: "If I move the wrong way"  Activities which decrease pain: ice and heating pad, ibuprofen 600mg x twice daily    OBJECTIVE     Today's Treatment: Therapeutic exercises x 35 min,  fluido x 15 min    Current Treatment: manual therapy/joint mobilization, Therapeutic exercises, Sensory re-education, Desensitization, Scar management, Edema management, Joint protection and Orthotic fitting/training    Sensation: Ulnar Nerve Impaired     [place] Chris Monofilament Test:   6.65 (Deep pressure only) - Ulnar distribution of palm and small finger  4.56 (Loss of protective sensation) - " Radial and ulnar halves of proximal and middle phalanges of ring finger  4.31 (Diminished Protective sensation) - MCP pad of ring finger  3.61(Diminished light touch) - Radial and ulnar halves of distal phalanx of ring finger   2.83 (Normal) - throughout median nerve distribution    Scar Assessment: Hypersensitivity in central portion of scar. Incision sites are almost fully healed with small open portion remaining in center. Scabbing tissue observed around edges of scar site.    Edema: Circumferential measurements: as follows: (compared to 2/19/19)   Index Middle Ring Small   Proximal Phalanx 7.6 cm (0) 7.4 cm (0) 7.0 cm (-3mm) 6.6 cm (-1mm)     MCP's 23.5 cm (-4mm)   Distal Palmar Crease 24.6 cm (0)   Proximal Wrist Crease  20.2 cm (+2mm)     Range of Motion: right Passive flexion (compared to 2/19/19)  (Ext/Flex) Index Middle Ring Small   MCP Jt 86° 87° 82° 86°   PIP Jt 84° 87° 83° 85°   DIP Jt 59° 61° 60° 66°   ACOSTA TBA ° ° °   Total passive flexion 229° 235° 225° 237°     (Ext/Flex) Thumb   MCP Jt WNL within splint   IP Jt    Opposition    Palmar Abd    Radial Abd      Elbow Ext:  WNL° Flex:  WNL°   Wrist Ext:  TBA° Flex:  TBA°   Wrist RD:  TBA° UD:  TBA°   Forearm Pron:  WNL° Sup:  WNL°       AROM is as follows:    Range of Motion: Active Ext/Flex 3/13/2019 (in degrees)  (Ext/Flex) Index Middle Ring Small   MCP Jt 30/65 30/65 20/47 8/30   PIP Jt 26/55 58/65 73/77 62/77   DIP Jt 0/30 22/33 18/30 32/42   ACOSTA 64 53 43 47     Thumb MP ext/flex 0/50  Thumb IP ext/flex 0/45    Elbow Ext:  WNL Flex:  WNL   Wrist Ext:  30 Flex:  20   Wrist RD:  15 UD:  10   Forearm Pron:  WNL Sup:  WNL         Ashu received the following supervised modalities after being cleared for contradictions for 15 minutes:   -Patient received  fluido for 15 minutes to increase blood flow, circulation, pain management and for tissue elasticity prior to therex.     Ashu received manual therapy for 5 minutes including:  RM and STM to right  hand/wrist    Ashu received therapeutic exercises for 30 minutes including:  -PROM as follows: jt blocking, thumb opposition, finger flexion, finger extension with MPs at 90, wrist flex and wrist ext to neutral only  -gentle AROM as follows: TGEs, thumb opposition, wrist flex/ext with fingers flexed and RD/UD with fingers flexed, finger ext, abd/add  2 x 15 reps  -towel scrunches x 10 reps  - small dowel wrist 3 ways 2 x 15 reps  -Dextercizer x 3'  -large pom poms 3 containers  with thumb and each finger.        Hand Strength:    / pinch strength testing N/A at present.  TBA at a later date.    Functional Limitations: Patient presents with the following functional Limitations:   Self Care / ADL: managing one handed  Home/Work Activities: working light duty  Leisure: working out    Home Exercises Provided: Postural exercises, passive digit flexion (ind and composite) with active extension with MCPs flexed; Edema control techniques, orthotic fabrication/fit/training, instruction in use, wear and care precautions for orthotic and patient reported good understanding of above 10 reps each, 4-6 x day.  3/11/19: Patient provided with right large isotoner glove to be worn daily/nightly for edema control. Patient purchased vibration massager to be used for sensory re-education and edema management for 5 min 1-2 x daily. Issued AROM exercises today.  3/21/2019: Issued finger ext and abd/add ex today for HEP    Education provided re: tendon, nerve, artery repair protocols, healing time frames  Ashu verbalized good understanding of education provided.   No spiritual or educational barriers to learning provided    ASSESSMENT     Patient is now 5 W 0 D s/p I&D with ulnar artery/laceration repair and 4 W 2 D s/p tendon, nerve repair. Continued A/AA/PROM exercises with his right hand and wrist. Advanced with light activities and pt tolerated all activities well.     Treatment Diagnosis/ Problem List RUE Decreased ROM,  Decreased  strength, Decreased pinch strength, Decreased functional hand use, Increased pain, Edema, Joint Stiffness, Scar Adhesion potential and Diminished/Impaired Sensation    Previous STG's:  NOT MET  New Short Term Goals: Increase ROM 3-5 degrees to increase functional hand use for ADLs/work/leisure activities, Increase  strength 3-5 lbs. to improve functional grasp for ADLs/work/leisure activities, Increase pinch 1-3 psi's to increase IND wiht button and FM Coordination., Decrease edema .2-.3 mm to increase joint mobility/flexibility for hand use. (AROM and strength TBA as appropriate)    Pt prognosis is Excellent. Pt will continue to benefit from skilled outpatient hand therapy to address the deficits listed in the problem list, provide pt education and to maximize pt's level of independence in the home and community environment.      PLAN: Re-assess next visit      Will continue/progress with established Plan of Care towards OT goals as orders are received 2 x week for 6-8 more weeks.  Thank you!    LEONEL Olvera, CHT  3/25/2019    I certify the need for these services furnished under this plan of treatment and while under my care.     ____________________________________                         __________________  Physician/Referring Practitioner                                               Date of Signature

## 2019-03-28 ENCOUNTER — CLINICAL SUPPORT (OUTPATIENT)
Dept: REHABILITATION | Facility: HOSPITAL | Age: 39
End: 2019-03-28
Attending: CLINIC/CENTER
Payer: COMMERCIAL

## 2019-03-28 DIAGNOSIS — M25.60 RANGE OF MOTION DEFICIT: ICD-10-CM

## 2019-03-28 PROCEDURE — 97110 THERAPEUTIC EXERCISES: CPT

## 2019-03-28 PROCEDURE — 97022 WHIRLPOOL THERAPY: CPT

## 2019-03-28 PROCEDURE — 97140 MANUAL THERAPY 1/> REGIONS: CPT

## 2019-03-28 NOTE — PROGRESS NOTES
"              HAND THERAPY/OCCUPATIONAL THERAPY PROGRESS REPORT     Date:  2019 Clinic Number: [id number]  Patient: Ashu Thomas        : 1980      Age: 38 y.o.     Sex: male                       Hand dominance: right  Referring Physician: Dr. Ferreira  RTD:  ?  Diagnosis: Complete laceration of right ulnar wrist with ulnar nerve and artery laceration and FCU, FDS, and FDP to RF and SF  Precautions:  Splint wear/care; infection control; no functional use of    Date of Injury: 19  Date of Surgery: 19 - right hand I&D with ulnar artery/laceration repair; 19 - right hand tendon/nerve repair                S/P: 4W 0D s/p I&D with ulnar artery/laceration repair; 3W 2D s/p tendon, nerve repair  Surgical Procedure: Right hand I&D with ulnar artery/laceration repair on 19; Right hand tendon, nerve repair on 19    Visit #:  (new referral)  Time In: 10:30 AM  Time Out: 11:30  AM  Billable Time: 55 minutes  Charges: fluido, 2 TE, 1 MT    Occupation:  for [employer]  Work Status: Light duty    SUBJECTIVE     Pt reports he is still having a "constant tingle" in ulnar hand. He reports his hand aches "every now and then." He reports his swelling has been decreased    Functional Pain Scale Rating 0-10: 2/10  Location: ulnar hand and volar central palm  Description: Aching and Tingling  Activities which increase pain: "If I move the wrong way"  Activities which decrease pain: ice and heating pad, ibuprofen 600mg x twice daily    OBJECTIVE     Today's Treatment: Therapeutic exercises x 35 min,  fluido x 15 min    Current Treatment: manual therapy/joint mobilization, Therapeutic exercises, Sensory re-education, Desensitization, Scar management, Edema management, Joint protection and Orthotic fitting/training    Sensation: Ulnar Nerve Impaired     [place] Chris Monofilament Test:   6.65 (Deep pressure only) - Ulnar distribution of palm and small finger  4.56 (Loss of " protective sensation) - Radial and ulnar halves of proximal and middle phalanges of ring finger  4.31 (Diminished Protective sensation) - MCP pad of ring finger  3.61(Diminished light touch) - Radial and ulnar halves of distal phalanx of ring finger   2.83 (Normal) - throughout median nerve distribution    Scar Assessment: Hypersensitivity in central portion of scar. Incision sites are almost fully healed with small open portion remaining in center. Scabbing tissue observed around edges of scar site.    Edema: Circumferential measurements: as follows: (compared to 2/19/19)   Index Middle Ring Small   Proximal Phalanx 6.8 cm (-.8) 7.2 cm (-.2) 6.8 cm (-2mm) 6.2 cm (-4mm)     MCP's 22.0 cm (-1.5))   Distal Palmar Crease 22.6 cm (   Proximal Wrist Crease  19.5 cm (-.7mm)         AROM is as follows:    Range of Motion: Active Ext/Flex 3/28/2019 (in degrees) Changes noted in () since 3/13/2019  (Ext/Flex) Index Middle Ring Small   MCP Jt 15/75 15/65 0/50 0/87   PIP Jt 18/73 32/80 55/95 58/80   DIP Jt 0/56 8/55 5/45 23/58   ACOSTA 181 (+117) 145 (+89) 130 (+87) 144 (+97)     Thumb MP ext/flex 0/55  Thumb IP ext/flex 0/48    Elbow Ext:  WNL Flex:  WNL   Wrist Ext:  35 Flex:  25   Wrist RD:  15 UD:  10   Forearm Pron:  WNL Sup:  WNL         Ashu received the following supervised modalities after being cleared for contradictions for 15 minutes:   -Patient received  fluido for 15 minutes to increase blood flow, circulation, pain management and for tissue elasticity prior to therex.     Ashu received manual therapy for 10 minutes including:  RM and STM to right hand/wrist    Ashu received therapeutic exercises for 30 minutes including:  -PROM as follows: jt blocking, thumb opposition, finger flexion, finger extension with MPs at 90, wrist flex and wrist ext to neutral only  -gentle AROM as follows: TGEs, thumb opposition, wrist flex/ext with fingers flexed and RD/UD with fingers flexed, finger ext, abd/add  2 x 15 reps  -towel  scrunches x 10 reps  - small dowel wrist 3 ways 2 x 15 reps  -Dextercizer x 3'  -large pom poms 3 containers  with thumb and each finger.    Adjusted orthosis for increased MP extension at 50 degrees.         Hand Strength:    / pinch strength testing N/A at present.  TBA at a later date.    Functional Limitations: Patient presents with the following functional Limitations:   Self Care / ADL: managing one handed  Home/Work Activities: working light duty  Leisure: working out    Home Exercises Provided: Postural exercises, passive digit flexion (ind and composite) with active extension with MCPs flexed; Edema control techniques, orthotic fabrication/fit/training, instruction in use, wear and care precautions for orthotic and patient reported good understanding of above 10 reps each, 4-6 x day.  3/11/19: Patient provided with right large isotoner glove to be worn daily/nightly for edema control. Patient purchased vibration massager to be used for sensory re-education and edema management for 5 min 1-2 x daily. Issued AROM exercises today.  3/21/2019: Issued finger ext and abd/add ex today for HEP    Education provided re: tendon, nerve, artery repair protocols, healing time frames  Ashu verbalized good understanding of education provided.   No spiritual or educational barriers to learning provided    ASSESSMENT     Patient is now 5 W 0 D s/p I&D with ulnar artery/laceration repair and 4 W 2 D s/p tendon, nerve repair. Continued A/AA/PROM exercises with his right hand and wrist. Pt has made significant AROM gains in his fingers since his last measurement. Edema has decreased. Continued with light activities and pt tolerated all activities well.     Treatment Diagnosis/ Problem List RUE Decreased ROM, Decreased  strength, Decreased pinch strength, Decreased functional hand use, Increased pain, Edema, Joint Stiffness, Scar Adhesion potential and Diminished/Impaired Sensation    Previous STG's:  NOT  MET  New Short Term Goals: Increase ROM 3-5 degrees to increase functional hand use for ADLs/work/leisure activities, Increase  strength 3-5 lbs. to improve functional grasp for ADLs/work/leisure activities, Increase pinch 1-3 psi's to increase IND wiht button and FM Coordination., Decrease edema .2-.3 mm to increase joint mobility/flexibility for hand use. (AROM and strength TBA as appropriate)    Pt prognosis is Excellent. Pt will continue to benefit from skilled outpatient hand therapy to address the deficits listed in the problem list, provide pt education and to maximize pt's level of independence in the home and community environment.      PLAN: FOTO next session.      Will continue/progress with established Plan of Care towards OT goals as orders are received 2 x week for 6-8 more weeks.  Thank you!    LEONEL Olvera, CHT  3/28/2019    I certify the need for these services furnished under this plan of treatment and while under my care.     ____________________________________                         __________________  Physician/Referring Practitioner                                               Date of Signature

## 2019-04-01 ENCOUNTER — CLINICAL SUPPORT (OUTPATIENT)
Dept: REHABILITATION | Facility: HOSPITAL | Age: 39
End: 2019-04-01
Attending: CLINIC/CENTER
Payer: COMMERCIAL

## 2019-04-01 DIAGNOSIS — M25.60 RANGE OF MOTION DEFICIT: ICD-10-CM

## 2019-04-01 PROCEDURE — 97110 THERAPEUTIC EXERCISES: CPT

## 2019-04-01 PROCEDURE — 97022 WHIRLPOOL THERAPY: CPT

## 2019-04-01 PROCEDURE — 97140 MANUAL THERAPY 1/> REGIONS: CPT

## 2019-04-01 NOTE — PROGRESS NOTES
"              HAND THERAPY/OCCUPATIONAL THERAPY PROGRESS REPORT     Date:  2019 Clinic Number: [id number]  Patient: Ashu Thomas        : 1980      Age: 38 y.o.     Sex: male                       Hand dominance: right  Referring Physician: Dr. Ferreira  RTD:  ?  Diagnosis: Complete laceration of right ulnar wrist with ulnar nerve and artery laceration and FCU, FDS, and FDP to RF and SF  Precautions:  Splint wear/care; infection control; no functional use of    Date of Injury: 19  Date of Surgery: 19 - right hand I&D with ulnar artery/laceration repair; 19 - right hand tendon/nerve repair                S/P: 4W 0D s/p I&D with ulnar artery/laceration repair; 3W 2D s/p tendon, nerve repair  Surgical Procedure: Right hand I&D with ulnar artery/laceration repair on 19; Right hand tendon, nerve repair on 19    Visit #:  (new referral)  Time In: 11:45 AM  Time Out: 12:45 PM  Billable Time: 55 minutes  Charges: fluido, 2 TE, 1 MT    Occupation:  for [employer]  Work Status: Light duty    SUBJECTIVE     Pt reports that he was able to fold clothes this weekend using his right hand as an assist.     Functional Pain Scale Rating 0-10: 2/10  Location: ulnar hand and volar central palm  Description: Aching and Tingling  Activities which increase pain: "If I move the wrong way"  Activities which decrease pain: ice and heating pad, ibuprofen 600mg x twice daily    OBJECTIVE     Today's Treatment: Therapeutic exercises x 35 min,  fluido x 15 min    Current Treatment: manual therapy/joint mobilization, Therapeutic exercises, Sensory re-education, Desensitization, Scar management, Edema management, Joint protection and Orthotic fitting/training    Sensation: Ulnar Nerve Impaired     [place] Chris Monofilament Test:   6.65 (Deep pressure only) - Ulnar distribution of palm and small finger  4.56 (Loss of protective sensation) - Radial and ulnar halves of proximal and " middle phalanges of ring finger  4.31 (Diminished Protective sensation) - MCP pad of ring finger  3.61(Diminished light touch) - Radial and ulnar halves of distal phalanx of ring finger   2.83 (Normal) - throughout median nerve distribution    Scar Assessment: Hypersensitivity in central portion of scar. Incision sites are almost fully healed with small open portion remaining in center. Scabbing tissue observed around edges of scar site.    Edema: Circumferential measurements: as follows: (compared to 2/19/19)   Index Middle Ring Small   Proximal Phalanx 6.8 cm (-.8) 7.2 cm (-.2) 6.8 cm (-2mm) 6.2 cm (-4mm)     MCP's 22.0 cm (-1.5))   Distal Palmar Crease 22.6 cm (   Proximal Wrist Crease  19.5 cm (-.7mm)         AROM is as follows:    Range of Motion: Active Ext/Flex 3/28/2019 (in degrees) Changes noted in () since 3/13/2019  (Ext/Flex) Index Middle Ring Small   MCP Jt 15/75 15/65 0/50 0/87   PIP Jt 18/73 32/80 55/95 58/80   DIP Jt 0/56 8/55 5/45 23/58   ACOSTA 181 (+117) 145 (+89) 130 (+87) 144 (+97)     Thumb MP ext/flex 0/55  Thumb IP ext/flex 0/48    Elbow Ext:  WNL Flex:  WNL   Wrist Ext:  35 Flex:  25   Wrist RD:  15 UD:  10   Forearm Pron:  WNL Sup:  WNL       CMS Impairment/Limitation/Restriction for FOTO Hand Survey    Therapist reviewed FOTO scores for Ashu Thomas on 4/1/2019.   FOTO documents entered into [employer] - see Media section.    Limitation Score: 64%  Category: Carrying    Current : CL = least 60% but < 80% impaired, limited or restricted  Goal: CK = at least 40% but < 60% impaired, limited or restricted           Ashu received the following supervised modalities after being cleared for contradictions for 15 minutes:   -Patient received  fluido for 15 minutes to increase blood flow, circulation, pain management and for tissue elasticity prior to therex.     Ashu received manual therapy for 10 minutes including:  RM and STM to right hand/wrist    Ashu received therapeutic exercises for 30  minutes including:  -PROM as follows: jt blocking, thumb opposition, finger flexion, finger extension with MPs at 90, wrist flex and wrist ext to neutral only  -gentle AROM as follows: jt blocking, TGEs, thumb opposition, wrist flex/ext with fingers flexed and RD/UD with fingers flexed, finger ext, abd/add 2  x 10 reps  -towel scrunches x 10 reps  - small dowel wrist 3 ways 2 x 15 reps  -Dextercizer x 3'  -large pom poms 3 containers  with thumb and each finger  -1 container nesting large pom poms    Adjusted orthosis for increased MP extension at 50 degrees.         Hand Strength:    / pinch strength testing N/A at present.  TBA at a later date.    Functional Limitations: Patient presents with the following functional Limitations:   Self Care / ADL: managing one handed  Home/Work Activities: working light duty  Leisure: working out    Home Exercises Provided: Postural exercises, passive digit flexion (ind and composite) with active extension with MCPs flexed; Edema control techniques, orthotic fabrication/fit/training, instruction in use, wear and care precautions for orthotic and patient reported good understanding of above 10 reps each, 4-6 x day.  3/11/19: Patient provided with right large isotoner glove to be worn daily/nightly for edema control. Patient purchased vibration massager to be used for sensory re-education and edema management for 5 min 1-2 x daily. Issued AROM exercises today.  3/21/2019: Issued finger ext and abd/add ex today for HEP    Education provided re: tendon, nerve, artery repair protocols, healing time frames  Ashu verbalized good understanding of education provided.   No spiritual or educational barriers to learning provided    ASSESSMENT     Patient is now 7 W 0 D s/p I&D with ulnar artery/laceration repair and 6 W 2 D s/p tendon, nerve repair. Continued A/AA/PROM exercises with his right hand and wrist. Pt has made significant AROM gains in his fingers since his last  measurement. Edema has decreased. Continued with light activities and pt tolerated all activities well.     Treatment Diagnosis/ Problem List RUE Decreased ROM, Decreased  strength, Decreased pinch strength, Decreased functional hand use, Increased pain, Edema, Joint Stiffness, Scar Adhesion potential and Diminished/Impaired Sensation    Previous STG's:  NOT MET  New Short Term Goals: Increase ROM 3-5 degrees to increase functional hand use for ADLs/work/leisure activities, Increase  strength 3-5 lbs. to improve functional grasp for ADLs/work/leisure activities, Increase pinch 1-3 psi's to increase IND wiht button and FM Coordination., Decrease edema .2-.3 mm to increase joint mobility/flexibility for hand use. (AROM and strength TBA as appropriate)    Pt prognosis is Excellent. Pt will continue to benefit from skilled outpatient hand therapy to address the deficits listed in the problem list, provide pt education and to maximize pt's level of independence in the home and community environment.      PLAN: Re-assess next session.      Will continue/progress with established Plan of Care towards OT goals as orders are received 2 x week for 6-8 more weeks.  Thank you!    LEONEL Olvera, CHT  4/1/2019    I certify the need for these services furnished under this plan of treatment and while under my care.     ____________________________________                         __________________  Physician/Referring Practitioner                                               Date of Signature

## 2019-04-05 ENCOUNTER — CLINICAL SUPPORT (OUTPATIENT)
Dept: REHABILITATION | Facility: HOSPITAL | Age: 39
End: 2019-04-05
Attending: CLINIC/CENTER
Payer: COMMERCIAL

## 2019-04-05 DIAGNOSIS — M25.60 RANGE OF MOTION DEFICIT: ICD-10-CM

## 2019-04-05 PROCEDURE — 97140 MANUAL THERAPY 1/> REGIONS: CPT

## 2019-04-05 PROCEDURE — 97110 THERAPEUTIC EXERCISES: CPT

## 2019-04-05 PROCEDURE — 97022 WHIRLPOOL THERAPY: CPT

## 2019-04-05 NOTE — PROGRESS NOTES
"              HAND THERAPY/OCCUPATIONAL THERAPY PROGRESS REPORT     Date:  2019 Clinic Number: [id number]  Patient: Ashu Thomas        : 1980      Age: 38 y.o.     Sex: male                       Hand dominance: right  Referring Physician: Dr. Ferreira  RTD:  ?  Diagnosis: Complete laceration of right ulnar wrist with ulnar nerve and artery laceration and FCU, FDS, and FDP to RF and SF  Precautions:  Splint wear/care; infection control; no functional use of    Date of Injury: 19  Date of Surgery: 19 - right hand I&D with ulnar artery/laceration repair; 19 - right hand tendon/nerve repair                S/P: 4W 0D s/p I&D with ulnar artery/laceration repair; 3W 2D s/p tendon, nerve repair  Surgical Procedure: Right hand I&D with ulnar artery/laceration repair on 19; Right hand tendon, nerve repair on 19    Visit #:  (new referral)  Time In: 9:00 AM  Time Out: 10:00 AM  Billable Time: 55 minutes  Charges: fluido, 2 TE, 1 MT    Occupation:  for [employer]  Work Status: Light duty    SUBJECTIVE     Pt reports that he was able to fold clothes this weekend using his right hand as an assist.     Functional Pain Scale Rating 0-10: 2/10  Location: ulnar hand and volar central palm  Description: Aching and Tingling  Activities which increase pain: "If I move the wrong way"  Activities which decrease pain: ice and heating pad, ibuprofen 600mg x twice daily    OBJECTIVE     Today's Treatment: Therapeutic exercises x 35 min,  fluido x 15 min    Current Treatment: manual therapy/joint mobilization, Therapeutic exercises, Sensory re-education, Desensitization, Scar management, Edema management, Joint protection and Orthotic fitting/training    Sensation: Ulnar Nerve Impaired     [place] Chris Monofilament Test:   6.65 (Deep pressure only) - Ulnar distribution of palm and small finger  4.56 (Loss of protective sensation) - Radial and ulnar halves of proximal and " middle phalanges of ring finger  4.31 (Diminished Protective sensation) - MCP pad of ring finger  3.61(Diminished light touch) - Radial and ulnar halves of distal phalanx of ring finger   2.83 (Normal) - throughout median nerve distribution    Scar Assessment: Hypersensitivity in central portion of scar. Incision sites are almost fully healed with small open portion remaining in center. Scabbing tissue observed around edges of scar site.    Edema: Circumferential measurements: as follows: (compared to 2/19/19)   Index Middle Ring Small   Proximal Phalanx 6.8 cm (-.8) 7.2 cm (-.2) 6.8 cm (-2mm) 6.2 cm (-4mm)     MCP's 22.0 cm (-1.5))   Distal Palmar Crease 22.6 cm (   Proximal Wrist Crease  19.5 cm (-.7mm)         AROM is as follows:    Range of Motion: Active Ext/Flex 3/28/2019 (in degrees) Changes noted in () since 3/13/2019  (Ext/Flex) Index Middle Ring Small   MCP Jt 15/75 15/65 0/50 0/87   PIP Jt 18/73 32/80 55/95 58/80   DIP Jt 0/56 8/55 5/45 23/58   ACOSTA 181 (+117) 145 (+89) 130 (+87) 144 (+97)     Thumb MP ext/flex 0/55  Thumb IP ext/flex 0/48    Elbow Ext:  WNL Flex:  WNL   Wrist Ext:  35 Flex:  25   Wrist RD:  15 UD:  10   Forearm Pron:  WNL Sup:  WNL         Ashu received the following supervised modalities after being cleared for contradictions for 15 minutes:   -Patient received  fluido for 15 minutes to increase blood flow, circulation, pain management and for tissue elasticity prior to therex.     Ashu received manual therapy for 10 minutes including:  RM and STM to right hand/wrist    Ashu received therapeutic exercises for 30 minutes including:  -PROM as follows: jt blocking, thumb opposition, finger flexion, finger extension with MPs at 90, wrist flex and wrist ext to neutral only  -gentle AROM as follows: jt blocking, TGEs, thumb opposition, wrist flex/ext with fingers flexed and RD/UD with fingers flexed, finger ext, abd/add 2  x 10 reps  -towel scrunches x 10 reps  - small dowel wrist 3 ways 2  x 15 reps  -Dextercizer x 3'  -large pom poms 3 containers nesting  -small pom poms 3 containers  with thumb and each finger  - rice x 3'      Issued Silicone scar sheet    Hand Strength:    / pinch strength testing N/A at present.  TBA at a later date.    Functional Limitations: Patient presents with the following functional Limitations:   Self Care / ADL: managing one handed  Home/Work Activities: working light duty  Leisure: working out    Home Exercises Provided: Postural exercises, passive digit flexion (ind and composite) with active extension with MCPs flexed; Edema control techniques, orthotic fabrication/fit/training, instruction in use, wear and care precautions for orthotic and patient reported good understanding of above 10 reps each, 4-6 x day.  3/11/19: Patient provided with right large isotoner glove to be worn daily/nightly for edema control. Patient purchased vibration massager to be used for sensory re-education and edema management for 5 min 1-2 x daily. Issued AROM exercises today.  3/21/2019: Issued finger ext and abd/add ex today for HEP    Education provided re: tendon, nerve, artery repair protocols, healing time frames  Ashu verbalized good understanding of education provided.   No spiritual or educational barriers to learning provided    ASSESSMENT     Patient is now 7 W 0 D s/p I&D with ulnar artery/laceration repair and 6 W 2 D s/p tendon, nerve repair. Continued A/AA/PROM exercises with his right hand and wrist.  Continued with light activities and pt tolerated all activities well.     Treatment Diagnosis/ Problem List RUE Decreased ROM, Decreased  strength, Decreased pinch strength, Decreased functional hand use, Increased pain, Edema, Joint Stiffness, Scar Adhesion potential and Diminished/Impaired Sensation      New Short Term Goals: Increase ROM 3-5 degrees to increase functional hand use for ADLs/work/leisure activities, Increase  strength 3-5 lbs. to improve  functional grasp for ADLs/work/leisure activities, Increase pinch 1-3 psi's to increase IND wiht button and FM Coordination., Decrease edema .2-.3 mm to increase joint mobility/flexibility for hand use. (strength TBA as appropriate)    Pt prognosis is Excellent. Pt will continue to benefit from skilled outpatient hand therapy to address the deficits listed in the problem list, provide pt education and to maximize pt's level of independence in the home and community environment.      PLAN: Re-assess next session.      Will continue/progress with established Plan of Care towards OT goals as orders are received 2 x week for 6-8 more weeks.  Thank you!    LEONEL Olvera, CHT  4/5/2019    I certify the need for these services furnished under this plan of treatment and while under my care.     ____________________________________                         __________________  Physician/Referring Practitioner                                               Date of Signature

## 2019-04-08 ENCOUNTER — CLINICAL SUPPORT (OUTPATIENT)
Dept: REHABILITATION | Facility: HOSPITAL | Age: 39
End: 2019-04-08
Attending: CLINIC/CENTER
Payer: COMMERCIAL

## 2019-04-08 DIAGNOSIS — M25.60 RANGE OF MOTION DEFICIT: ICD-10-CM

## 2019-04-08 PROCEDURE — 97022 WHIRLPOOL THERAPY: CPT

## 2019-04-08 PROCEDURE — 97110 THERAPEUTIC EXERCISES: CPT

## 2019-04-08 NOTE — PROGRESS NOTES
"              HAND THERAPY/OCCUPATIONAL THERAPY PROGRESS REPORT     Date:  2019 Clinic Number: [id number]  Patient: Ashu Thomas        : 1980      Age: 38 y.o.     Sex: male                       Hand dominance: right  Referring Physician: Dr. Ferreira  RTD:  ?  Diagnosis: Complete laceration of right ulnar wrist with ulnar nerve and artery laceration and FCU, FDS, and FDP to RF and SF  Precautions:  Splint wear/care; infection control; no functional use of    Date of Injury: 19  Date of Surgery: 19 - right hand I&D with ulnar artery/laceration repair; 19 - right hand tendon/nerve repair  Surgical Procedure: Right hand I&D with ulnar artery/laceration repair on 19; Right hand tendon, nerve repair on 19    Visit #:  (new referral)  Time In: 12:00 PM  Time Out: 1:00 PM  Billable Time: 55 minutes  Charges: fluido, 3 TE    Occupation:  for [employer]  Work Status: Light duty    SUBJECTIVE     Pt reports that he was able to fold clothes this weekend using his right hand as an assist. He reports using his right hand to assist with shoe tying, dressing and eating.     Functional Pain Scale Rating 0-10: 2/10  Location: ulnar hand and volar central palm  Description: Aching and Tingling  Activities which increase pain: "If I move the wrong way"  Activities which decrease pain: ice and heating pad, ibuprofen 600mg x twice daily    OBJECTIVE     Today's Treatment: Therapeutic exercises x 35 min,  fluido x 15 min    Current Treatment: manual therapy/joint mobilization, Therapeutic exercises, Sensory re-education, Desensitization, Scar management, Edema management, Joint protection and Orthotic fitting/training    Sensation: Ulnar Nerve Impaired     [place] Chris Monofilament Test:   6.65 (Deep pressure only) -  small finger  4.56 (Loss of protective sensation)   4.31 (Diminished Protective sensation)   3.61(Diminished light touch) ring finger  2.83 (Normal) - " throughout median nerve distribution    Scar Assessment: Hypersensitivity in central portion of scar. Incision sites are almost fully healed with small open portion remaining in center. Scabbing tissue observed around edges of scar site.    Edema: Circumferential measurements: as follows: (compared to 2/19/19)   Index Middle Ring Small   Proximal Phalanx 7.0 cm (+.2) 7.1 cm (-.1) 6.8 cm (n/c) 6.2 cm (n/c)     MCP's 22.5 cm (+.5))   Distal Palmar Crease 23 cm (+.4)   Proximal Wrist Crease  20.4cm (+.9mm)         AROM is as follows:    Range of Motion: Active Ext/Flex 4/8/2019 (in degrees) Changes noted in () since 3/28/2019  (Ext/Flex) Index Middle Ring Small   MCP Jt 0/75 12/70 0/55 0/60   PIP Jt 8/90 15/90 40/95 30/90   DIP Jt 0/60 5/62 5/45 10/58   ACOSTA 217 (+36) 190 (+45) 150 (+20) 168 (+24)     Thumb MP ext/flex 0/55  Thumb IP ext/flex 0/48    Elbow Ext:  WNL Flex:  WNL   Wrist Ext:  40 Flex:  30   Wrist RD:  15 UD:  10   Forearm Pron:  WNL Sup:  WNL         Ashu received the following supervised modalities after being cleared for contradictions for 15 minutes:   -Patient received  fluido for 15 minutes to increase blood flow, circulation, pain management and for tissue elasticity prior to therex.     Ashu received manual therapy for 5 minutes including:  RM and STM to right hand/wrist    Ashu received therapeutic exercises for 40 minutes including:  -PROM as follows: jt blocking, thumb opposition, finger flexion, finger extension with MPs at 90, wrist flex and wrist ext to neutral only  -gentle AROM as follows: jt blocking, TGEs, thumb opposition, wrist flex/ext with fingers flexed and RD/UD with fingers flexed, finger ext, abd/add 2  x 10 reps  -towel scrunches x 10 reps  - small dowel wrist 3 ways 2 x 15 reps  -Dextercizer x 3'  -large pom poms 3 containers nesting  -small pom poms 3 containers  with thumb and each finger  - rice x 3'      Hand Strength:    / pinch strength testing N/A at  present.  TBA at a later date.    Functional Limitations: Patient presents with the following functional Limitations:   Self Care / ADL: managing one handed  Home/Work Activities: working light duty  Leisure: working out    Home Exercises Provided: Postural exercises, passive digit flexion (ind and composite) with active extension with MCPs flexed; Edema control techniques, orthotic fabrication/fit/training, instruction in use, wear and care precautions for orthotic and patient reported good understanding of above 10 reps each, 4-6 x day.  3/11/19: Patient provided with right large isotoner glove to be worn daily/nightly for edema control. Patient purchased vibration massager to be used for sensory re-education and edema management for 5 min 1-2 x daily. Issued AROM exercises today.  3/21/2019: Issued finger ext and abd/add ex today for HEP    Education provided re: tendon, nerve, artery repair protocols, healing time frames  Ashu verbalized good understanding of education provided.   No spiritual or educational barriers to learning provided    ASSESSMENT     Patient is now 7 W 0 D s/p I&D with ulnar artery/laceration repair and 6 W 2 D s/p tendon, nerve repair. Pt has made moderate AROM gains since his last measurements on 3/28/2019. Edema has slightly increased. Noted improvement in sensation of his ring finger. He is using his right hand to assist with dressing and eating. Pt is able to tie his shoes now. A mild clawing deformity is noted with his ring and small finger.     Treatment Diagnosis/ Problem List RUE Decreased ROM, Decreased  strength, Decreased pinch strength, Decreased functional hand use, Increased pain, Edema, Joint Stiffness, Scar Adhesion potential and Diminished/Impaired Sensation      New Short Term Goals: Increase ROM 3-5 degrees to increase functional hand use for ADLs/work/leisure activities, Increase  strength 3-5 lbs. to improve functional grasp for ADLs/work/leisure activities,  Increase pinch 1-3 psi's to increase IND wiht button and FM Coordination., Decrease edema .2-.3 mm to increase joint mobility/flexibility for hand use. (strength TBA as appropriate)    Pt prognosis is Excellent. Pt will continue to benefit from skilled outpatient hand therapy to address the deficits listed in the problem list, provide pt education and to maximize pt's level of independence in the home and community environment.      PLAN:  Consult with physician. Begin strengthening if ok per physician.       Will continue/progress with established Plan of Care towards OT goals as orders are received 2 x week for 6-8 more weeks.  Thank you!    LEONEL Olvera, CHT  4/8/2019    I certify the need for these services furnished under this plan of treatment and while under my care.     ____________________________________                         __________________  Physician/Referring Practitioner                                               Date of Signature

## 2019-04-11 ENCOUNTER — CLINICAL SUPPORT (OUTPATIENT)
Dept: REHABILITATION | Facility: HOSPITAL | Age: 39
End: 2019-04-11
Attending: CLINIC/CENTER
Payer: COMMERCIAL

## 2019-04-11 DIAGNOSIS — M25.60 RANGE OF MOTION DEFICIT: ICD-10-CM

## 2019-04-11 PROCEDURE — 97022 WHIRLPOOL THERAPY: CPT

## 2019-04-11 PROCEDURE — 97110 THERAPEUTIC EXERCISES: CPT

## 2019-04-11 NOTE — PROGRESS NOTES
"              HAND THERAPY/OCCUPATIONAL THERAPY PROGRESS REPORT     Date:  2019 Clinic Number: [id number]  Patient: Ashu Thomas        : 1980      Age: 38 y.o.     Sex: male                       Hand dominance: right  Referring Physician: Dr. Ferreira  RTD:  ?  Diagnosis: Complete laceration of right ulnar wrist with ulnar nerve and artery laceration and FCU, FDS, and FDP to RF and SF  Precautions:  Splint wear/care; infection control; no functional use of    Date of Injury: 19  Date of Surgery: 19 - right hand I&D with ulnar artery/laceration repair; 19 - right hand tendon/nerve repair  Surgical Procedure: Right hand I&D with ulnar artery/laceration repair on 19; Right hand tendon, nerve repair on 19    Visit #:  (new referral)  Time In: 11:00 PM  Time Out: 12:00 PM  Billable Time: 55 minutes  Charges: fluido, 3 TE    Occupation:  for [employer]  Work Status: Light duty    SUBJECTIVE     Pt reports that he was able to fold clothes this weekend using his right hand as an assist. He reports using his right hand to assist with shoe tying, dressing and eating. He reports that his physician was pleased with his progress and can begin light strengthening next week.    Functional Pain Scale Rating 0-10: 0/10  Location: ulnar hand and volar central palm  Description: Aching and Tingling  Activities which increase pain: "If I move the wrong way"  Activities which decrease pain: ice and heating pad, ibuprofen 600mg x twice daily    OBJECTIVE     Today's Treatment: Therapeutic exercises x 35 min,  fluido x 15 min    Current Treatment: manual therapy/joint mobilization, Therapeutic exercises, Sensory re-education, Desensitization, Scar management, Edema management, Joint protection and Orthotic fitting/training    Sensation: Ulnar Nerve Impaired     [place] Chris Monofilament Test:   6.65 (Deep pressure only) -  small finger  4.56 (Loss of protective " sensation)   4.31 (Diminished Protective sensation)   3.61(Diminished light touch) ring finger  2.83 (Normal) - throughout median nerve distribution    Scar Assessment: Hypersensitivity in central portion of scar. Scar well healed.    Edema: Circumferential measurements: as follows: (compared to 2/19/19)   Index Middle Ring Small   Proximal Phalanx 7.0 cm (+.2) 7.1 cm (-.1) 6.8 cm (n/c) 6.2 cm (n/c)     MCP's 22.5 cm (+.5))   Distal Palmar Crease 23 cm (+.4)   Proximal Wrist Crease  20.4cm (+.9mm)         AROM is as follows:    Range of Motion: Active Ext/Flex 4/8/2019 (in degrees) Changes noted in () since 3/28/2019  (Ext/Flex) Index Middle Ring Small   MCP Jt 0/75 12/70 0/55 0/60   PIP Jt 8/90 15/90 40/95 30/90   DIP Jt 0/60 5/62 5/45 10/58   ACOSTA 217 (+36) 190 (+45) 150 (+20) 168 (+24)     Thumb MP ext/flex 0/55  Thumb IP ext/flex 0/48    Elbow Ext:  WNL Flex:  WNL   Wrist Ext:  40 Flex:  30   Wrist RD:  15 UD:  10   Forearm Pron:  WNL Sup:  WNL         Ashu received the following supervised modalities after being cleared for contradictions for 15 minutes:   -Patient received  fluido for 15 minutes to increase blood flow, circulation, pain management and for tissue elasticity prior to therex.     Ashu received manual therapy for 5 minutes including:  RM and STM to right hand/wrist    Ashu received therapeutic exercises for 40 minutes including:  -PROM as follows: jt blocking, thumb opposition, finger flexion, finger extension with MPs at 90, wrist flex and wrist ext to neutral only  -gentle AROM as follows: jt blocking, TGEs, thumb opposition, wrist flex/ext with fingers flexed and RD/UD with fingers flexed, finger ext, abd/add 2  x 10 reps  -towel scrunches x 10 reps  - small dowel wrist 3 ways 2 x 15 reps  -Dextercizer x 3'  -large pom poms 3 containers nesting  -small pom poms 3 containers  with thumb and each finger  - rice x 3'      Hand Strength:    / pinch strength testing N/A at present.  TBA  at a later date.    Functional Limitations: Patient presents with the following functional Limitations:   Self Care / ADL: managing one handed  Home/Work Activities: working light duty  Leisure: working out    Home Exercises Provided: Postural exercises, passive digit flexion (ind and composite) with active extension with MCPs flexed; Edema control techniques, orthotic fabrication/fit/training, instruction in use, wear and care precautions for orthotic and patient reported good understanding of above 10 reps each, 4-6 x day.  3/11/19: Patient provided with right large isotoner glove to be worn daily/nightly for edema control. Patient purchased vibration massager to be used for sensory re-education and edema management for 5 min 1-2 x daily. Issued AROM exercises today.  3/21/2019: Issued finger ext and abd/add ex today for HEP    Education provided re: tendon, nerve, artery repair protocols, healing time frames  Ashu verbalized good understanding of education provided.   No spiritual or educational barriers to learning provided    ASSESSMENT     Patient is now 7 W 2 D s/p I&D with ulnar artery/laceration repair and 6 W 4 D s/p tendon, nerve repair.     Treatment Diagnosis/ Problem List RUE Decreased ROM, Decreased  strength, Decreased pinch strength, Decreased functional hand use, Increased pain, Edema, Joint Stiffness, Scar Adhesion potential and Diminished/Impaired Sensation      New Short Term Goals: Increase ROM 3-5 degrees to increase functional hand use for ADLs/work/leisure activities, Increase  strength 3-5 lbs. to improve functional grasp for ADLs/work/leisure activities, Increase pinch 1-3 psi's to increase IND wiht button and FM Coordination., Decrease edema .2-.3 mm to increase joint mobility/flexibility for hand use. (strength TBA as appropriate)    Pt prognosis is Excellent. Pt will continue to benefit from skilled outpatient hand therapy to address the deficits listed in the problem list,  "provide pt education and to maximize pt's level of independence in the home and community environment.      PLAN:  Advance strengthening next session.      Will continue/progress with established Plan of Care towards OT goals as orders are received 2 x week for 6-8 more weeks.  Thank you!    LEONEL Olvera, CHT  2019    I certify the need for these services furnished under this plan of treatment and while under my care.     ____________________________________                         __________________  Physician/Referring Practitioner                                               Date of Signature                               HAND THERAPY/OCCUPATIONAL THERAPY PROGRESS REPORT     Date:  2019 Clinic Number: [id number]  Patient: Ashu Thomas        : 1980      Age: 38 y.o.     Sex: male                       Hand dominance: right  Referring Physician: Dr. Ferreira  RTD:  ?  Diagnosis: Complete laceration of right ulnar wrist with ulnar nerve and artery laceration and FCU, FDS, and FDP to RF and SF  Precautions:  Splint wear/care; infection control; no functional use of    Date of Injury: 19  Date of Surgery: 19 - right hand I&D with ulnar artery/laceration repair; 19 - right hand tendon/nerve repair  Surgical Procedure: Right hand I&D with ulnar artery/laceration repair on 19; Right hand tendon, nerve repair on 19    Visit #:  (new referral)  Time In: 11:00 AM  Time Out: 12:00 PM  Billable Time: 55 minutes  Charges: fluido, 3 TE    Occupation:  for [employer]  Work Status: Light duty    SUBJECTIVE     Pt reports that he was able to fold clothes this weekend using his right hand as an assist. He reports using his right hand to assist with shoe tying, dressing and eating.     Functional Pain Scale Rating 0-10: 2/10  Location: ulnar hand and volar central palm  Description: Aching and Tingling  Activities which increase pain: "If I move the wrong " "way"  Activities which decrease pain: ice and heating pad, ibuprofen 600mg x twice daily    OBJECTIVE     Today's Treatment: Therapeutic exercises x 35 min,  fluido x 15 min    Current Treatment: manual therapy/joint mobilization, Therapeutic exercises, Sensory re-education, Desensitization, Scar management, Edema management, Joint protection and Orthotic fitting/training    Sensation: Ulnar Nerve Impaired     [place] Chris Monofilament Test:   6.65 (Deep pressure only) -  small finger  4.56 (Loss of protective sensation)   4.31 (Diminished Protective sensation)   3.61(Diminished light touch) ring finger  2.83 (Normal) - throughout median nerve distribution    Scar Assessment: Hypersensitivity in central portion of scar. Incision sites are almost fully healed with small open portion remaining in center. Scabbing tissue observed around edges of scar site.    Edema: Circumferential measurements: as follows: (compared to 2/19/19)   Index Middle Ring Small   Proximal Phalanx 7.0 cm (+.2) 7.1 cm (-.1) 6.8 cm (n/c) 6.2 cm (n/c)     MCP's 22.5 cm (+.5))   Distal Palmar Crease 23 cm (+.4)   Proximal Wrist Crease  20.4cm (+.9mm)         AROM is as follows:    Range of Motion: Active Ext/Flex 4/8/2019 (in degrees) Changes noted in () since 3/28/2019  (Ext/Flex) Index Middle Ring Small   MCP Jt 0/75 12/70 0/55 0/60   PIP Jt 8/90 15/90 40/95 30/90   DIP Jt 0/60 5/62 5/45 10/58   ACOSTA 217 (+36) 190 (+45) 150 (+20) 168 (+24)     Thumb MP ext/flex 0/55  Thumb IP ext/flex 0/48    Elbow Ext:  WNL Flex:  WNL   Wrist Ext:  40 Flex:  30   Wrist RD:  15 UD:  10   Forearm Pron:  WNL Sup:  WNL         Ashu received the following supervised modalities after being cleared for contradictions for 15 minutes:   -Patient received  fluido for 15 minutes to increase blood flow, circulation, pain management and for tissue elasticity prior to therex.     Ashu received manual therapy for 5 minutes including:  RM and STM to right " hand/wrist    Ashu received therapeutic exercises for 40 minutes including:  -PROM as follows: jt blocking, thumb opposition, finger flexion, finger extension with MPs at 90, wrist flex and wrist ext to neutral only  -gentle AROM as follows: jt blocking, TGEs, thumb opposition, wrist flex/ext with fingers flexed and RD/UD with fingers flexed, finger ext, abd/add 2  x 10 reps  -towel scrunches x 10 reps  - small dowel wrist 3 ways 2 x 15 reps  -Dextercizer x 3'  -large pom poms 3 containers nesting  -small pom poms 3 containers  with thumb and each finger  - rice x 3'      Hand Strength:    / pinch strength testing N/A at present.  TBA at a later date.    Functional Limitations: Patient presents with the following functional Limitations:   Self Care / ADL: managing one handed  Home/Work Activities: working light duty  Leisure: working out    Home Exercises Provided: Postural exercises, passive digit flexion (ind and composite) with active extension with MCPs flexed; Edema control techniques, orthotic fabrication/fit/training, instruction in use, wear and care precautions for orthotic and patient reported good understanding of above 10 reps each, 4-6 x day.  3/11/19: Patient provided with right large isotoner glove to be worn daily/nightly for edema control. Patient purchased vibration massager to be used for sensory re-education and edema management for 5 min 1-2 x daily. Issued AROM exercises today.  3/21/2019: Issued finger ext and abd/add ex today for HEP    Education provided re: tendon, nerve, artery repair protocols, healing time frames  Ashu verbalized good understanding of education provided.   No spiritual or educational barriers to learning provided    ASSESSMENT     Patient is now 7 W 0 D s/p I&D with ulnar artery/laceration repair and 6 W 2 D s/p tendon, nerve repair. Pt has made moderate AROM gains since his last measurements on 3/28/2019. Edema has slightly increased. Noted improvement in  sensation of his ring finger. He is using his right hand to assist with dressing and eating. Pt is able to tie his shoes now. A mild clawing deformity is noted with his ring and small finger.     Treatment Diagnosis/ Problem List RUE Decreased ROM, Decreased  strength, Decreased pinch strength, Decreased functional hand use, Increased pain, Edema, Joint Stiffness, Scar Adhesion potential and Diminished/Impaired Sensation      New Short Term Goals: Increase ROM 3-5 degrees to increase functional hand use for ADLs/work/leisure activities, Increase  strength 3-5 lbs. to improve functional grasp for ADLs/work/leisure activities, Increase pinch 1-3 psi's to increase IND wiht button and FM Coordination., Decrease edema .2-.3 mm to increase joint mobility/flexibility for hand use. (strength TBA as appropriate)    Pt prognosis is Excellent. Pt will continue to benefit from skilled outpatient hand therapy to address the deficits listed in the problem list, provide pt education and to maximize pt's level of independence in the home and community environment.      PLAN:  Cont with OT      Will continue/progress with established Plan of Care towards OT goals as orders are received 2 x week for 6-8 more weeks.  Thank you!    LEONEL Olvera, T  4/11/2019    I certify the need for these services furnished under this plan of treatment and while under my care.     ____________________________________                         __________________  Physician/Referring Practitioner                                               Date of Signature

## 2019-04-15 ENCOUNTER — CLINICAL SUPPORT (OUTPATIENT)
Dept: REHABILITATION | Facility: HOSPITAL | Age: 39
End: 2019-04-15
Attending: CLINIC/CENTER
Payer: COMMERCIAL

## 2019-04-15 DIAGNOSIS — M25.60 RANGE OF MOTION DEFICIT: Primary | ICD-10-CM

## 2019-04-15 PROCEDURE — 97022 WHIRLPOOL THERAPY: CPT

## 2019-04-15 PROCEDURE — 97110 THERAPEUTIC EXERCISES: CPT

## 2019-04-15 NOTE — PROGRESS NOTES
"              HAND THERAPY/OCCUPATIONAL THERAPY PROGRESS REPORT     Date:  04/15/2019 Clinic Number: [id number]  Patient: Ashu Thomas        : 1980      Age: 38 y.o.     Sex: male                       Hand dominance: right  Referring Physician: Dr. Ferreira  RTD:  ?  Diagnosis: Complete laceration of right ulnar wrist with ulnar nerve and artery laceration and FCU, FDS, and FDP to RF and SF  Precautions:  Splint wear/care; infection control; no functional use of    Date of Injury: 19  Date of Surgery: 19 - right hand I&D with ulnar artery/laceration repair; 19 - right hand tendon/nerve repair  Surgical Procedure: Right hand I&D with ulnar artery/laceration repair on 19; Right hand tendon, nerve repair on 19    Visit #:  (new referral)  Time In: 11:00 PM  Time Out: 12:00 PM  Billable Time: 55 minutes  Charges: fluido, 3 TE    Occupation:  for [employer]  Work Status: Light duty    SUBJECTIVE     Pt reports that he was able to fold clothes this weekend using his right hand as an assist. He reports using his right hand to assist with shoe tying, dressing and eating. He reports that his physician was pleased with his progress and can begin light strengthening next week.    Functional Pain Scale Rating 0-10: 0/10  Location: ulnar hand and volar central palm  Description: Aching and Tingling  Activities which increase pain: "If I move the wrong way"  Activities which decrease pain: ice and heating pad, ibuprofen 600mg x twice daily    OBJECTIVE     Today's Treatment: Therapeutic exercises x 35 min,  fluido x 15 min    Current Treatment: manual therapy/joint mobilization, Therapeutic exercises, Sensory re-education, Desensitization, Scar management, Edema management, Joint protection and Orthotic fitting/training    Sensation: Ulnar Nerve Impaired     [place] Chris Monofilament Test:   6.65 (Deep pressure only) -  small finger  4.56 (Loss of protective " sensation)   4.31 (Diminished Protective sensation)   3.61(Diminished light touch) ring finger  2.83 (Normal) - throughout median nerve distribution    Scar Assessment: Hypersensitivity in central portion of scar. Scar well healed.    Edema: Circumferential measurements: as follows: (compared to 2/19/19)   Index Middle Ring Small   Proximal Phalanx 7.0 cm (+.2) 7.1 cm (-.1) 6.8 cm (n/c) 6.2 cm (n/c)     MCP's 22.5 cm (+.5))   Distal Palmar Crease 23 cm (+.4)   Proximal Wrist Crease  20.4cm (+.9mm)         AROM is as follows:    Range of Motion: Active Ext/Flex 4/8/2019 (in degrees) Changes noted in () since 3/28/2019  (Ext/Flex) Index Middle Ring Small   MCP Jt 0/75 12/70 0/55 0/60   PIP Jt 8/90 15/90 40/95 30/90   DIP Jt 0/60 5/62 5/45 10/58   ACOSTA 217 (+36) 190 (+45) 150 (+20) 168 (+24)     Thumb MP ext/flex 0/55  Thumb IP ext/flex 0/48    Elbow Ext:  WNL Flex:  WNL   Wrist Ext:  40 Flex:  30   Wrist RD:  15 UD:  10   Forearm Pron:  WNL Sup:  WNL         Ashu received the following supervised modalities after being cleared for contradictions for 15 minutes:   -Patient received  fluido for 15 minutes to increase blood flow, circulation, pain management and for tissue elasticity prior to therex.     Ashu received manual therapy for 5 minutes including:  RM and STM to right hand/wrist    Ashu received therapeutic exercises for 40 minutes including:  -PROM as follows: jt blocking, thumb opposition, finger flexion, finger extension with MPs at 90, wrist flex and wrist ext to neutral only  -gentle AROM as follows: jt blocking, TGEs, thumb opposition, wrist flex/ext with fingers flexed and RD/UD with fingers flexed, finger ext, abd/add 2  x 10 reps  -towel scrunches x 10 reps  - small dowel wrist 3 ways 2 x 15 reps  -Dextercizer x 3'  -large pom poms 3 containers nesting  -small pom poms 3 containers  with thumb and each finger  - rice x 3'      Hand Strength:    / pinch strength testing N/A at present.  TBA  at a later date.    Functional Limitations: Patient presents with the following functional Limitations:   Self Care / ADL: managing one handed  Home/Work Activities: working light duty  Leisure: working out    Home Exercises Provided: Postural exercises, passive digit flexion (ind and composite) with active extension with MCPs flexed; Edema control techniques, orthotic fabrication/fit/training, instruction in use, wear and care precautions for orthotic and patient reported good understanding of above 10 reps each, 4-6 x day.  3/11/19: Patient provided with right large isotoner glove to be worn daily/nightly for edema control. Patient purchased vibration massager to be used for sensory re-education and edema management for 5 min 1-2 x daily. Issued AROM exercises today.  3/21/2019: Issued finger ext and abd/add ex today for HEP    Education provided re: tendon, nerve, artery repair protocols, healing time frames  Ashu verbalized good understanding of education provided.   No spiritual or educational barriers to learning provided    ASSESSMENT     Patient is now 7 W 2 D s/p I&D with ulnar artery/laceration repair and 6 W 4 D s/p tendon, nerve repair.     Treatment Diagnosis/ Problem List RUE Decreased ROM, Decreased  strength, Decreased pinch strength, Decreased functional hand use, Increased pain, Edema, Joint Stiffness, Scar Adhesion potential and Diminished/Impaired Sensation      New Short Term Goals: Increase ROM 3-5 degrees to increase functional hand use for ADLs/work/leisure activities, Increase  strength 3-5 lbs. to improve functional grasp for ADLs/work/leisure activities, Increase pinch 1-3 psi's to increase IND wiht button and FM Coordination., Decrease edema .2-.3 mm to increase joint mobility/flexibility for hand use. (strength TBA as appropriate)    Pt prognosis is Excellent. Pt will continue to benefit from skilled outpatient hand therapy to address the deficits listed in the problem list,  "provide pt education and to maximize pt's level of independence in the home and community environment.      PLAN:  Advance strengthening next session.      Will continue/progress with established Plan of Care towards OT goals as orders are received 2 x week for 6-8 more weeks.  Thank you!    LEONEL Olvera, CHT  4/15/2019    I certify the need for these services furnished under this plan of treatment and while under my care.     ____________________________________                         __________________  Physician/Referring Practitioner                                               Date of Signature                               HAND THERAPY/OCCUPATIONAL THERAPY PROGRESS REPORT     Date:  04/15/2019 Clinic Number: [id number]  Patient: Ashu Thomas        : 1980      Age: 38 y.o.     Sex: male                       Hand dominance: right  Referring Physician: Dr. Ferreira  RTD:  ?  Diagnosis: Complete laceration of right ulnar wrist with ulnar nerve and artery laceration and FCU, FDS, and FDP to RF and SF  Precautions:  Splint wear/care; infection control; no functional use of    Date of Injury: 19  Date of Surgery: 19 - right hand I&D with ulnar artery/laceration repair; 19 - right hand tendon/nerve repair  Surgical Procedure: Right hand I&D with ulnar artery/laceration repair on 19; Right hand tendon, nerve repair on 19    Visit #: 10/20 (new referral)  Time In: 11:00 AM  Time Out: 12:00 PM  Billable Time: 55 minutes  Charges: fluido, 3 TE    Occupation:  for [employer]  Work Status: Light duty    SUBJECTIVE     Pt reports that he was able to fold clothes this weekend using his right hand as an assist. He reports using his right hand to assist with shoe tying, dressing and eating.     Functional Pain Scale Rating 0-10: 2/10  Location: ulnar hand and volar central palm  Description: Aching and Tingling  Activities which increase pain: "If I move the wrong " "way"  Activities which decrease pain: ice and heating pad, ibuprofen 600mg x twice daily    OBJECTIVE     Today's Treatment: Therapeutic exercises x 35 min,  fluido x 15 min    Current Treatment: manual therapy/joint mobilization, Therapeutic exercises, Sensory re-education, Desensitization, Scar management, Edema management, Joint protection and Orthotic fitting/training    Sensation: Ulnar Nerve Impaired     [place] Chris Monofilament Test:   6.65 (Deep pressure only) -  small finger  4.56 (Loss of protective sensation)   4.31 (Diminished Protective sensation)   3.61(Diminished light touch) ring finger  2.83 (Normal) - throughout median nerve distribution    Scar Assessment: Hypersensitivity in central portion of scar. Incision sites are almost fully healed with small open portion remaining in center. Scabbing tissue observed around edges of scar site.    Edema: Circumferential measurements: as follows: (compared to 2/19/19)   Index Middle Ring Small   Proximal Phalanx 7.0 cm (+.2) 7.1 cm (-.1) 6.8 cm (n/c) 6.2 cm (n/c)     MCP's 22.5 cm (+.5))   Distal Palmar Crease 23 cm (+.4)   Proximal Wrist Crease  20.4cm (+.9mm)         AROM is as follows:    Range of Motion: Active Ext/Flex 4/8/2019 (in degrees) Changes noted in () since 3/28/2019  (Ext/Flex) Index Middle Ring Small   MCP Jt 0/75 12/70 0/55 0/60   PIP Jt 8/90 15/90 40/95 30/90   DIP Jt 0/60 5/62 5/45 10/58   ACOSTA 217 (+36) 190 (+45) 150 (+20) 168 (+24)     Thumb MP ext/flex 0/55  Thumb IP ext/flex 0/48    Elbow Ext:  WNL Flex:  WNL   Wrist Ext:  40 Flex:  30   Wrist RD:  15 UD:  10   Forearm Pron:  WNL Sup:  WNL         Ashu received the following supervised modalities after being cleared for contradictions for 15 minutes:   -Patient received  fluido for 15 minutes to increase blood flow, circulation, pain management and for tissue elasticity prior to therex.     Ashu received manual therapy for 5 minutes including:  RM and STM to right " hand/wrist    Ashu received therapeutic exercises for 40 minutes including:  -PROM as follows: jt blocking, thumb opposition, finger flexion, finger extension with MPs at 90, wrist flex and wrist ext to neutral only  -gentle AROM as follows: jt blocking, TGEs, thumb opposition, wrist flex/ext with fingers flexed and RD/UD with fingers flexed, finger ext, abd/add 2  x 10 reps  -towel scrunches x 10 reps  -Dextercizer x 3'  -large pom poms 3 containers nesting  -small pom poms 3 containers  with thumb and each finger  - 1 lb wrist 3 ways x 15 reps  -yellow theraputty as follows: 2' molding, 30 grasps, 5 flowers, 15 pinches with thumb and ring/small finger      Hand Strength:    Rung II: Right 11 lbs, Left 90 lbs    Functional Limitations: Patient presents with the following functional Limitations:   Self Care / ADL: managing one handed  Home/Work Activities: working light duty  Leisure: working out    Home Exercises Provided: Postural exercises, passive digit flexion (ind and composite) with active extension with MCPs flexed; Edema control techniques, orthotic fabrication/fit/training, instruction in use, wear and care precautions for orthotic and patient reported good understanding of above 10 reps each, 4-6 x day.  3/11/19: Patient provided with right large isotoner glove to be worn daily/nightly for edema control. Patient purchased vibration massager to be used for sensory re-education and edema management for 5 min 1-2 x daily. Issued AROM exercises today.  3/21/2019: Issued finger ext and abd/add ex today for HEP  4/15/2019: yellow putty listed in patient instructions    Education provided re: tendon, nerve, artery repair protocols, healing time frames  Ashu verbalized good understanding of education provided.   No spiritual or educational barriers to learning provided    ASSESSMENT     Patient is now 8 W 4 D s/p I&D with ulnar artery/laceration repair and 7 W 6 D s/p tendon, nerve repair.   strength taken today. Advanced strengthening exercises and pt tolerated well. Issued yellow putty for HEP.    Treatment Diagnosis/ Problem List RUE Decreased ROM, Decreased  strength, Decreased pinch strength, Decreased functional hand use, Increased pain, Edema, Joint Stiffness, Scar Adhesion potential and Diminished/Impaired Sensation      New Short Term Goals: Increase ROM 3-5 degrees to increase functional hand use for ADLs/work/leisure activities, Increase  strength 3-5 lbs. to improve functional grasp for ADLs/work/leisure activities, Increase pinch 1-3 psi's to increase IND wiht button and FM Coordination., Decrease edema .2-.3 mm to increase joint mobility/flexibility for hand use. (strength TBA as appropriate)    Pt prognosis is Excellent. Pt will continue to benefit from skilled outpatient hand therapy to address the deficits listed in the problem list, provide pt education and to maximize pt's level of independence in the home and community environment.      PLAN:  Cont with OT      Will continue/progress with established Plan of Care towards OT goals as orders are received 2 x week for 6-8 more weeks.  Thank you!    LEONEL Olvera, CHT  4/15/2019    I certify the need for these services furnished under this plan of treatment and while under my care.     ____________________________________                         __________________  Physician/Referring Practitioner                                               Date of Signature

## 2019-04-15 NOTE — PATIENT INSTRUCTIONS
[facility]  OCCUPATIONAL THERAPY  HOME EXERCISE PROGRAM     Complete the following strengthening program 2 x/day.                       _                        LEONEL Olvera, ARANV  Certified Hand Therapist  Occupational Therapist

## 2019-04-17 PROBLEM — M25.60 RANGE OF MOTION DEFICIT: Status: RESOLVED | Noted: 2019-03-13 | Resolved: 2019-04-17

## 2019-04-18 ENCOUNTER — CLINICAL SUPPORT (OUTPATIENT)
Dept: REHABILITATION | Facility: HOSPITAL | Age: 39
End: 2019-04-18
Attending: CLINIC/CENTER
Payer: COMMERCIAL

## 2019-04-18 DIAGNOSIS — M25.60 RANGE OF MOTION DEFICIT: Primary | ICD-10-CM

## 2019-04-18 DIAGNOSIS — S61.511D LACERATION OF RIGHT WRIST WITH TENDON INVOLVEMENT, SUBSEQUENT ENCOUNTER: ICD-10-CM

## 2019-04-18 DIAGNOSIS — S66.921D LACERATION OF RIGHT WRIST WITH TENDON INVOLVEMENT, SUBSEQUENT ENCOUNTER: ICD-10-CM

## 2019-04-18 PROCEDURE — 97022 WHIRLPOOL THERAPY: CPT

## 2019-04-18 PROCEDURE — 97110 THERAPEUTIC EXERCISES: CPT

## 2019-04-18 NOTE — PROGRESS NOTES
"              HAND THERAPY/OCCUPATIONAL THERAPY PROGRESS REPORT     Date:  2019 Clinic Number: [id number]  Patient: Ashu Thomas        : 1980      Age: 38 y.o.     Sex: male                       Hand dominance: right  Referring Physician: Dr. Ferreira  RTD:  ?  Diagnosis: Complete laceration of right ulnar wrist with ulnar nerve and artery laceration and FCU, FDS, and FDP to RF and SF  Precautions:  Splint wear/care; infection control; no functional use of    Date of Injury: 19  Date of Surgery: 19 - right hand I&D with ulnar artery/laceration repair; 19 - right hand tendon/nerve repair  Surgical Procedure: Right hand I&D with ulnar artery/laceration repair on 19; Right hand tendon, nerve repair on 19    Visit #:  (new referral)  Time In: 11:00 PM  Time Out: 12:00 PM  Billable Time: 55 minutes  Charges: fluido, 3 TE    Occupation:  for [employer]  Work Status: Light duty    SUBJECTIVE     Pt reports that he was compliant with his HEP.    Functional Pain Scale Rating 0-10: 0/10  Location: ulnar hand and volar central palm  Description: Aching and Tingling  Activities which increase pain: "If I move the wrong way"  Activities which decrease pain: ice and heating pad, ibuprofen 600mg x twice daily    OBJECTIVE     Today's Treatment: Therapeutic exercises x 35 min,  fluido x 15 min    Current Treatment: manual therapy/joint mobilization, Therapeutic exercises, Sensory re-education, Desensitization, Scar management, Edema management, Joint protection and Orthotic fitting/training    Sensation: Ulnar Nerve Impaired     [place] Chris Monofilament Test:   6.65 (Deep pressure only) -  small finger  4.56 (Loss of protective sensation)   4.31 (Diminished Protective sensation)   3.61(Diminished light touch) ring finger  2.83 (Normal) - throughout median nerve distribution    Scar Assessment: Hypersensitivity in central portion of scar. Scar well " healed.    Edema: Circumferential measurements: as follows: (compared to 2/19/19)   Index Middle Ring Small   Proximal Phalanx 7.0 cm (+.2) 7.1 cm (-.1) 6.8 cm (n/c) 6.2 cm (n/c)     MCP's 22.5 cm (+.5))   Distal Palmar Crease 23 cm (+.4)   Proximal Wrist Crease  20.4cm (+.9mm)         AROM is as follows:    Range of Motion: Active Ext/Flex 4/8/2019 (in degrees) Changes noted in () since 3/28/2019  (Ext/Flex) Index Middle Ring Small   MCP Jt 0/75 12/70 0/55 0/60   PIP Jt 8/90 15/90 40/95 30/90   DIP Jt 0/60 5/62 5/45 10/58   ACOSTA 217 (+36) 190 (+45) 150 (+20) 168 (+24)     Thumb MP ext/flex 0/55  Thumb IP ext/flex 0/48    Elbow Ext:  WNL Flex:  WNL   Wrist Ext:  40 Flex:  30   Wrist RD:  15 UD:  10   Forearm Pron:  WNL Sup:  WNL         sAhu received the following supervised modalities after being cleared for contradictions for 15 minutes:   -Patient received  fluido for 15 minutes to increase blood flow, circulation, pain management and for tissue elasticity prior to therex.     Ashu received manual therapy for 5 minutes including:  RM and STM to right hand/wrist    Ashu received therapeutic exercises for 40 minutes including:  -PROM as follows: jt blocking, thumb opposition, finger flexion, finger extension with MPs at 90, wrist flex and wrist ext to neutral only  -gentle AROM as follows: jt blocking, TGEs, thumb opposition, wrist flex/ext with fingers flexed and RD/UD with fingers flexed, finger ext, abd/add 2  x 10 reps  -towel scrunches x 10 reps  - 1 lb wrist 3 ways 2 x 15 reps  -Dextercizer x 3'  -large pom poms 3 containers nesting  -small pom poms 3 containers  with thumb and each finger  - rice x 3'  -yellow putty: 2' molding, 30 grasps, 3 logs each thumb and small finger, 5 flowers      Hand Strength:    / pinch strength testing N/A at present.  TBA at a later date.    Functional Limitations: Patient presents with the following functional Limitations:   Self Care / ADL: managing one  handed  Home/Work Activities: working light duty  Leisure: working out    Home Exercises Provided: Postural exercises, passive digit flexion (ind and composite) with active extension with MCPs flexed; Edema control techniques, orthotic fabrication/fit/training, instruction in use, wear and care precautions for orthotic and patient reported good understanding of above 10 reps each, 4-6 x day.  3/11/19: Patient provided with right large isotoner glove to be worn daily/nightly for edema control. Patient purchased vibration massager to be used for sensory re-education and edema management for 5 min 1-2 x daily. Issued AROM exercises today.  3/21/2019: Issued finger ext and abd/add ex today for HEP    Education provided re: tendon, nerve, artery repair protocols, healing time frames  Ashu verbalized good understanding of education provided.   No spiritual or educational barriers to learning provided    ASSESSMENT     Patient is now 7 W 5 D s/p I&D with ulnar artery/laceration repair and 7 W 0 D s/p tendon, nerve repair. Tolerated all activities without difficulty.    Treatment Diagnosis/ Problem List RUE Decreased ROM, Decreased  strength, Decreased pinch strength, Decreased functional hand use, Increased pain, Edema, Joint Stiffness, Scar Adhesion potential and Diminished/Impaired Sensation      New Short Term Goals: Increase ROM 3-5 degrees to increase functional hand use for ADLs/work/leisure activities, Increase  strength 3-5 lbs. to improve functional grasp for ADLs/work/leisure activities, Increase pinch 1-3 psi's to increase IND wiht button and FM Coordination., Decrease edema .2-.3 mm to increase joint mobility/flexibility for hand use. (strength TBA as appropriate)    Pt prognosis is Excellent. Pt will continue to benefit from skilled outpatient hand therapy to address the deficits listed in the problem list, provide pt education and to maximize pt's level of independence in the home and community  environment.      PLAN:  Continue with OT      Will continue/progress with established Plan of Care towards OT goals as orders are received 2 x week for 6-8 more weeks.  Thank you!    LEONEL Olvera, CHT  4/18/2019    I certify the need for these services furnished under this plan of treatment and while under my care.     ____________________________________                         __________________  Physician/Referring Practitioner                                               Date of Signature

## 2019-04-25 ENCOUNTER — CLINICAL SUPPORT (OUTPATIENT)
Dept: REHABILITATION | Facility: HOSPITAL | Age: 39
End: 2019-04-25
Attending: CLINIC/CENTER
Payer: COMMERCIAL

## 2019-04-25 DIAGNOSIS — M25.60 RANGE OF MOTION DEFICIT: Primary | ICD-10-CM

## 2019-04-25 DIAGNOSIS — S61.511D LACERATION OF RIGHT WRIST WITH TENDON INVOLVEMENT, SUBSEQUENT ENCOUNTER: ICD-10-CM

## 2019-04-25 DIAGNOSIS — S66.921D LACERATION OF RIGHT WRIST WITH TENDON INVOLVEMENT, SUBSEQUENT ENCOUNTER: ICD-10-CM

## 2019-04-25 PROBLEM — S66.921A LACERATION OF RIGHT WRIST WITH TENDON INVOLVEMENT: Status: ACTIVE | Noted: 2019-02-16

## 2019-04-25 PROCEDURE — 97110 THERAPEUTIC EXERCISES: CPT

## 2019-04-25 PROCEDURE — 97022 WHIRLPOOL THERAPY: CPT

## 2019-04-25 NOTE — PROGRESS NOTES
"              HAND THERAPY/OCCUPATIONAL THERAPY PROGRESS REPORT     Date:  2019 Clinic Number: [id number]  Patient: Ashu Thomas        : 1980      Age: 38 y.o.     Sex: male                       Hand dominance: right  Referring Physician: Dr. Ferreira  RTD:  ?  Diagnosis: Complete laceration of right ulnar wrist with ulnar nerve and artery laceration and FCU, FDS, and FDP to RF and SF  Precautions:  Splint wear/care; infection control; no functional use of    Date of Injury: 19  Date of Surgery: 19 - right hand I&D with ulnar artery/laceration repair; 19 - right hand tendon/nerve repair  Surgical Procedure: Right hand I&D with ulnar artery/laceration repair on 19; Right hand tendon, nerve repair on 19    Visit #:  (new referral)  Time In: 11:00 PM  Time Out: 12:00 PM  Billable Time: 55 minutes  Charges: fluido, 3 TE    Occupation:  for [employer]  Work Status: Light duty    SUBJECTIVE     Pt reports that he was compliant with his HEP.    Functional Pain Scale Rating 0-10: 0/10  Location: ulnar hand and volar central palm  Description: Aching and Tingling  Activities which increase pain: "If I move the wrong way"  Activities which decrease pain: ice and heating pad, ibuprofen 600mg x twice daily    OBJECTIVE     Today's Treatment: Therapeutic exercises x 35 min,  fluido x 15 min    Current Treatment: manual therapy/joint mobilization, Therapeutic exercises, Sensory re-education, Desensitization, Scar management, Edema management, Joint protection and Orthotic fitting/training    Sensation: Ulnar Nerve Impaired     [place] Chris Monofilament Test:   6.65 (Deep pressure only) -  small finger  4.56 (Loss of protective sensation)   4.31 (Diminished Protective sensation)   3.61(Diminished light touch) ring finger  2.83 (Normal) - throughout median nerve distribution    Scar Assessment: Hypersensitivity in central portion of scar. Scar well " healed.    Edema: Circumferential measurements: as follows: (compared to 2/19/19)   Index Middle Ring Small   Proximal Phalanx 7.0 cm (+.2) 7.1 cm (-.1) 6.8 cm (n/c) 6.2 cm (n/c)     MCP's 22.5 cm (+.5))   Distal Palmar Crease 23 cm (+.4)   Proximal Wrist Crease  20.4cm (+.9mm)         AROM is as follows:    Range of Motion: Active Ext/Flex 4/8/2019 (in degrees) Changes noted in () since 3/28/2019  (Ext/Flex) Index Middle Ring Small   MCP Jt 0/75 12/70 0/55 0/60   PIP Jt 8/90 15/90 40/95 30/90   DIP Jt 0/60 5/62 5/45 10/58   ACOSTA 217 (+36) 190 (+45) 150 (+20) 168 (+24)     Thumb MP ext/flex 0/55  Thumb IP ext/flex 0/48    Elbow Ext:  WNL Flex:  WNL   Wrist Ext:  40 Flex:  30   Wrist RD:  15 UD:  10   Forearm Pron:  WNL Sup:  WNL         Ashu received the following supervised modalities after being cleared for contradictions for 15 minutes:   -Patient received  fluido for 15 minutes to increase blood flow, circulation, pain management and for tissue elasticity prior to therex.     Ashu received manual therapy for 5 minutes including:  RM and STM to right hand/wrist    Ashu received therapeutic exercises for 40 minutes including:  -PROM as follows: jt blocking, thumb opposition, finger flexion, finger extension with MPs at 90, wrist flex and wrist ext to neutral only  -gentle AROM as follows: jt blocking, TGEs, thumb opposition, wrist flex/ext with fingers flexed and RD/UD with fingers flexed, finger ext, abd/add 2  x 10 reps  -towel scrunches x 10 reps  - 2 lb wrist 3 ways 2 x 15 reps  -Dextercizer x 3'  -large pom poms 3 containers with yellow cp  -small pom poms 3 containers nesting  - rice x 3'  -red putty: 2' molding, 30 grasps, 3 logs each thumb and ring/ small finger, 5 flowers with yellow  -hand gripper 3 yellow rb x 30 reps      Hand Strength:    / pinch strength testing N/A at present.  TBA at a later date.    Functional Limitations: Patient presents with the following functional Limitations:   Self  Care / ADL: managing one handed  Home/Work Activities: working light duty  Leisure: working out    Home Exercises Provided: Postural exercises, passive digit flexion (ind and composite) with active extension with MCPs flexed; Edema control techniques, orthotic fabrication/fit/training, instruction in use, wear and care precautions for orthotic and patient reported good understanding of above 10 reps each, 4-6 x day.  3/11/19: Patient provided with right large isotoner glove to be worn daily/nightly for edema control. Patient purchased vibration massager to be used for sensory re-education and edema management for 5 min 1-2 x daily. Issued AROM exercises today.  3/21/2019: Issued finger ext and abd/add ex today for HEP    Education provided re: tendon, nerve, artery repair protocols, healing time frames  Ashu verbalized good understanding of education provided.   No spiritual or educational barriers to learning provided    ASSESSMENT     Patient is now 10 W 3 D s/p I&D with ulnar artery/laceration repair and 9 W 5 D s/p tendon, nerve repair. Advanced strengthening activities without difficulty.    Treatment Diagnosis/ Problem List RUE Decreased ROM, Decreased  strength, Decreased pinch strength, Decreased functional hand use, Increased pain, Edema, Joint Stiffness, Scar Adhesion potential and Diminished/Impaired Sensation      New Short Term Goals: Increase ROM 3-5 degrees to increase functional hand use for ADLs/work/leisure activities, Increase  strength 3-5 lbs. to improve functional grasp for ADLs/work/leisure activities, Increase pinch 1-3 psi's to increase IND wiht button and FM Coordination., Decrease edema .2-.3 mm to increase joint mobility/flexibility for hand use. (strength TBA as appropriate)    Pt prognosis is Excellent. Pt will continue to benefit from skilled outpatient hand therapy to address the deficits listed in the problem list, provide pt education and to maximize pt's level of  independence in the home and community environment.      PLAN:  Continue with OT      Will continue/progress with established Plan of Care towards OT goals as orders are received 2 x week for 6-8 more weeks.  Thank you!    LEONEL Olvera, CHT  4/25/2019    I certify the need for these services furnished under this plan of treatment and while under my care.     ____________________________________                         __________________  Physician/Referring Practitioner                                               Date of Signature

## 2019-04-29 ENCOUNTER — CLINICAL SUPPORT (OUTPATIENT)
Dept: REHABILITATION | Facility: HOSPITAL | Age: 39
End: 2019-04-29
Attending: CLINIC/CENTER
Payer: COMMERCIAL

## 2019-04-29 DIAGNOSIS — S61.511D LACERATION OF RIGHT WRIST WITH TENDON INVOLVEMENT, SUBSEQUENT ENCOUNTER: Primary | ICD-10-CM

## 2019-04-29 DIAGNOSIS — S66.921D LACERATION OF RIGHT WRIST WITH TENDON INVOLVEMENT, SUBSEQUENT ENCOUNTER: Primary | ICD-10-CM

## 2019-04-29 DIAGNOSIS — M25.60 RANGE OF MOTION DEFICIT: ICD-10-CM

## 2019-04-29 PROCEDURE — 97110 THERAPEUTIC EXERCISES: CPT

## 2019-04-29 PROCEDURE — 97022 WHIRLPOOL THERAPY: CPT

## 2019-04-29 NOTE — PROGRESS NOTES
"              HAND THERAPY/OCCUPATIONAL THERAPY PROGRESS REPORT     Date:  2019 Clinic Number: [id number]  Patient: Ashu Thomas        : 1980      Age: 38 y.o.     Sex: male                       Hand dominance: right  Referring Physician: Dr. Ferreira  RTD:  ?  Diagnosis: Complete laceration of right ulnar wrist with ulnar nerve and artery laceration and FCU, FDS, and FDP to RF and SF  Precautions:  Splint wear/care; infection control; no functional use of    Date of Injury: 19  Date of Surgery: 19 - right hand I&D with ulnar artery/laceration repair; 19 - right hand tendon/nerve repair  Surgical Procedure: Right hand I&D with ulnar artery/laceration repair on 19; Right hand tendon, nerve repair on 19    Visit #:  (new referral)  Time In: 11:00 PM  Time Out: 12:00 PM  Billable Time: 55 minutes  Charges: fluido, 3 TE    Occupation:  for [employer]  Work Status: Light duty    SUBJECTIVE     Pt reports that he was compliant with his HEP.    Functional Pain Scale Rating 0-10: 0/10  Location: ulnar hand and volar central palm  Description: Aching and Tingling  Activities which increase pain: "If I move the wrong way"  Activities which decrease pain: ice and heating pad, ibuprofen 600mg x twice daily    OBJECTIVE     Today's Treatment: Therapeutic exercises x 35 min,  fluido x 15 min    Current Treatment: manual therapy/joint mobilization, Therapeutic exercises, Sensory re-education, Desensitization, Scar management, Edema management, Joint protection and Orthotic fitting/training    Sensation: Ulnar Nerve Impaired     [place] Chris Monofilament Test:   6.65 (Deep pressure only) -  small finger  4.56 (Loss of protective sensation)   4.31 (Diminished Protective sensation)   3.61(Diminished light touch) ring finger  2.83 (Normal) - throughout median nerve distribution    Scar Assessment: Hypersensitivity in central portion of scar. Scar well " healed.    Edema: Circumferential measurements: as follows: (compared to 2/19/19)   Index Middle Ring Small   Proximal Phalanx 7.0 cm (+.2) 7.1 cm (-.1) 6.8 cm (n/c) 6.2 cm (n/c)     MCP's 22.5 cm (+.5))   Distal Palmar Crease 23 cm (+.4)   Proximal Wrist Crease  20.4cm (+.9mm)         AROM is as follows:    Range of Motion: Active Ext/Flex 4/8/2019 (in degrees) Changes noted in () since 3/28/2019  (Ext/Flex) Index Middle Ring Small   MCP Jt 0/75 12/70 0/55 0/60   PIP Jt 8/90 15/90 40/95 30/90   DIP Jt 0/60 5/62 5/45 10/58   ACOSTA 217 (+36) 190 (+45) 150 (+20) 168 (+24)     Thumb MP ext/flex 0/55  Thumb IP ext/flex 0/48    Elbow Ext:  WNL Flex:  WNL   Wrist Ext:  40 Flex:  30   Wrist RD:  15 UD:  10   Forearm Pron:  WNL Sup:  WNL         Ashu received the following supervised modalities after being cleared for contradictions for 15 minutes:   -Patient received  fluido for 15 minutes to increase blood flow, circulation, pain management and for tissue elasticity prior to therex.     Ashu received manual therapy for 5 minutes including:  RM and STM to right hand/wrist    Ashu received therapeutic exercises for 40 minutes including:  -PROM as follows: jt blocking, thumb opposition, finger flexion, finger extension with MPs at 90, wrist flex and wrist ext x 10 reps  -gentle AROM as follows: jt blocking, TGEs, thumb opposition, wrist flex/ext with fingers flexed and RD/UD with fingers flexed, finger ext, abd/add 2  x 10 reps  -towel scrunches x 10 reps  - 3 lb wrist 3 ways 2 x 15 reps  -large pom poms 3 containers with red cp  -small pom poms 3 containers nesting  - rice x 3' (NT)  -red putty: 2' molding, 30 grasps, 3 logs each thumb and ring/ small finger, 5 flowers with yellow  -hand gripper 1 yellow, 1 red, 1 green rb x 30 reps  -green digi-extend x 30 reps  -red theraflex bar smiles, frowns and twists x 15 reps      Hand Strength:    / pinch strength testing N/A at present.  TBA at a later date.    Functional  Limitations: Patient presents with the following functional Limitations:   Self Care / ADL: managing one handed  Home/Work Activities: working light duty  Leisure: working out    Home Exercises Provided: Postural exercises, passive digit flexion (ind and composite) with active extension with MCPs flexed; Edema control techniques, orthotic fabrication/fit/training, instruction in use, wear and care precautions for orthotic and patient reported good understanding of above 10 reps each, 4-6 x day.  3/11/19: Patient provided with right large isotoner glove to be worn daily/nightly for edema control. Patient purchased vibration massager to be used for sensory re-education and edema management for 5 min 1-2 x daily. Issued AROM exercises today.  3/21/2019: Issued finger ext and abd/add ex today for HEP    Education provided re: tendon, nerve, artery repair protocols, healing time frames  Ashu verbalized good understanding of education provided.   No spiritual or educational barriers to learning provided    ASSESSMENT     Patient is now 11 W 0 D s/p I&D with ulnar artery/laceration repair and 10 W 2 D s/p tendon, nerve repair. Advanced strengthening activities without difficulty.    Treatment Diagnosis/ Problem List RUE Decreased ROM, Decreased  strength, Decreased pinch strength, Decreased functional hand use, Increased pain, Edema, Joint Stiffness, Scar Adhesion potential and Diminished/Impaired Sensation      New Short Term Goals: Increase ROM 3-5 degrees to increase functional hand use for ADLs/work/leisure activities, Increase  strength 3-5 lbs. to improve functional grasp for ADLs/work/leisure activities, Increase pinch 1-3 psi's to increase IND wiht button and FM Coordination., Decrease edema .2-.3 mm to increase joint mobility/flexibility for hand use. (strength TBA as appropriate)    Pt prognosis is Excellent. Pt will continue to benefit from skilled outpatient hand therapy to address the deficits listed  in the problem list, provide pt education and to maximize pt's level of independence in the home and community environment.      PLAN:  Continue with OT      Will continue/progress with established Plan of Care towards OT goals as orders are received 2 x week for 6-8 more weeks.  Thank you!    LEONEL Olvera, CHT  4/29/2019    I certify the need for these services furnished under this plan of treatment and while under my care.     ____________________________________                         __________________  Physician/Referring Practitioner                                               Date of Signature

## 2019-05-01 ENCOUNTER — CLINICAL SUPPORT (OUTPATIENT)
Dept: REHABILITATION | Facility: HOSPITAL | Age: 39
End: 2019-05-01
Attending: CLINIC/CENTER
Payer: COMMERCIAL

## 2019-05-01 DIAGNOSIS — M25.60 RANGE OF MOTION DEFICIT: ICD-10-CM

## 2019-05-01 PROCEDURE — 97022 WHIRLPOOL THERAPY: CPT

## 2019-05-01 PROCEDURE — 97110 THERAPEUTIC EXERCISES: CPT

## 2019-05-01 NOTE — PROGRESS NOTES
"              HAND THERAPY/OCCUPATIONAL THERAPY PROGRESS REPORT     Date:  2019 Clinic Number: [id number]  Patient: Ashu Thomas        : 1980      Age: 38 y.o.     Sex: male                       Hand dominance: right  Referring Physician: Dr. Ferreira  RTD:  ?  Diagnosis: Complete laceration of right ulnar wrist with ulnar nerve and artery laceration and FCU, FDS, and FDP to RF and SF  Precautions:  Splint wear/care; infection control; no functional use of    Date of Injury: 19  Date of Surgery: 19 - right hand I&D with ulnar artery/laceration repair; 19 - right hand tendon/nerve repair  Surgical Procedure: Right hand I&D with ulnar artery/laceration repair on 19; Right hand tendon, nerve repair on 19    Visit #:  (new referral)  Time In: 12:00 PM  Time Out: 1:00 PM  Billable Time: 55 minutes  Charges: fluido, 3 TE    Occupation:  for [employer]  Work Status: Light duty    SUBJECTIVE     Pt reports that he was compliant with his HEP.    Functional Pain Scale Rating 0-10: 0/10  Location: ulnar hand and volar central palm  Description: Aching and Tingling  Activities which increase pain: "If I move the wrong way"  Activities which decrease pain: ice and heating pad, ibuprofen 600mg x twice daily    OBJECTIVE     Today's Treatment: Therapeutic exercises x 35 min,  fluido x 15 min    Current Treatment: manual therapy/joint mobilization, Therapeutic exercises, Sensory re-education, Desensitization, Scar management, Edema management, Joint protection and Orthotic fitting/training    Sensation: Ulnar Nerve Impaired     [place] Chris Monofilament Test:   6.65 (Deep pressure only) -  small finger  4.56 (Loss of protective sensation)   4.31 (Diminished Protective sensation)   3.61(Diminished light touch) ring finger  2.83 (Normal) - throughout median nerve distribution    Scar Assessment: Hypersensitivity in central portion of scar. Scar well " healed.    Edema: Circumferential measurements: as follows: (compared to 2/19/19)   Index Middle Ring Small   Proximal Phalanx 7.0 cm (+.2) 7.1 cm (-.1) 6.8 cm (n/c) 6.2 cm (n/c)     MCP's 22.5 cm (+.5))   Distal Palmar Crease 23 cm (+.4)   Proximal Wrist Crease  20.4cm (+.9mm)         AROM is as follows:    Range of Motion: Active Ext/Flex 4/8/2019 (in degrees) Changes noted in () since 3/28/2019  (Ext/Flex) Index Middle Ring Small   MCP Jt 0/75 12/70 0/55 0/60   PIP Jt 8/90 15/90 40/95 30/90   DIP Jt 0/60 5/62 5/45 10/58   ACOSTA 217 (+36) 190 (+45) 150 (+20) 168 (+24)     Thumb MP ext/flex 0/55  Thumb IP ext/flex 0/48    Elbow Ext:  WNL Flex:  WNL   Wrist Ext:  40 Flex:  30   Wrist RD:  15 UD:  10   Forearm Pron:  WNL Sup:  WNL         Ashu received the following supervised modalities after being cleared for contradictions for 15 minutes:   -Patient received  fluido for 15 minutes to increase blood flow, circulation, pain management and for tissue elasticity prior to therex.     Ashu received manual therapy for 5 minutes including:  RM and STM to right hand/wrist    Ashu received therapeutic exercises for 40 minutes including:  -PROM as follows: jt blocking, thumb opposition, finger flexion, finger extension with MPs at 90, wrist flex and wrist ext x 10 reps  -gentle AROM as follows: jt blocking, TGEs, thumb opposition, wrist flex/ext with fingers flexed and RD/UD with fingers flexed, finger ext, abd/add 2  x 10 reps  -towel scrunches x 10 reps  - 3 lb wrist 3 ways 2 x 15 reps  -large pom poms 3 containers with red cp  -small pom poms 3 containers nesting  -red putty: 2' molding, 30 grasps, 3 logs each thumb and ring/ small finger, 5 flowers   -hand gripper 1 yellow, 1 red, 1 green rb x 30 reps  -green digi-extend x 30 reps  -red theraflex bar smiles, frowns and twists x 15 reps      Hand Strength:    / pinch strength testing N/A at present.  TBA at a later date.    Functional Limitations: Patient presents  with the following functional Limitations:   Self Care / ADL: managing one handed  Home/Work Activities: working light duty  Leisure: working out    Home Exercises Provided: Postural exercises, passive digit flexion (ind and composite) with active extension with MCPs flexed; Edema control techniques, orthotic fabrication/fit/training, instruction in use, wear and care precautions for orthotic and patient reported good understanding of above 10 reps each, 4-6 x day.  3/11/19: Patient provided with right large isotoner glove to be worn daily/nightly for edema control. Patient purchased vibration massager to be used for sensory re-education and edema management for 5 min 1-2 x daily. Issued AROM exercises today.  3/21/2019: Issued finger ext and abd/add ex today for HEP    Education provided re: tendon, nerve, artery repair protocols, healing time frames  Ashu verbalized good understanding of education provided.   No spiritual or educational barriers to learning provided    ASSESSMENT     Patient is now 11 W 2 D s/p I&D with ulnar artery/laceration repair and 10 W 4 D s/p tendon, nerve repair. Advanced strengthening activities without difficulty.    Treatment Diagnosis/ Problem List RUE Decreased ROM, Decreased  strength, Decreased pinch strength, Decreased functional hand use, Increased pain, Edema, Joint Stiffness, Scar Adhesion potential and Diminished/Impaired Sensation      New Short Term Goals: Increase ROM 3-5 degrees to increase functional hand use for ADLs/work/leisure activities, Increase  strength 3-5 lbs. to improve functional grasp for ADLs/work/leisure activities, Increase pinch 1-3 psi's to increase IND wiht button and FM Coordination., Decrease edema .2-.3 mm to increase joint mobility/flexibility for hand use. (strength TBA as appropriate)    Pt prognosis is Excellent. Pt will continue to benefit from skilled outpatient hand therapy to address the deficits listed in the problem list, provide  pt education and to maximize pt's level of independence in the home and community environment.      PLAN:  Continue with OT      Will continue/progress with established Plan of Care towards OT goals as orders are received 2 x week for 6-8 more weeks.  Thank you!    LEONEL Olvera, CHT  5/1/2019    I certify the need for these services furnished under this plan of treatment and while under my care.     ____________________________________                         __________________  Physician/Referring Practitioner                                               Date of Signature

## 2019-05-06 ENCOUNTER — CLINICAL SUPPORT (OUTPATIENT)
Dept: REHABILITATION | Facility: HOSPITAL | Age: 39
End: 2019-05-06
Attending: CLINIC/CENTER
Payer: COMMERCIAL

## 2019-05-06 DIAGNOSIS — S66.921D LACERATION OF RIGHT WRIST WITH TENDON INVOLVEMENT, SUBSEQUENT ENCOUNTER: ICD-10-CM

## 2019-05-06 DIAGNOSIS — S61.511D LACERATION OF RIGHT WRIST WITH TENDON INVOLVEMENT, SUBSEQUENT ENCOUNTER: ICD-10-CM

## 2019-05-06 DIAGNOSIS — M25.60 RANGE OF MOTION DEFICIT: Primary | ICD-10-CM

## 2019-05-06 PROCEDURE — 97022 WHIRLPOOL THERAPY: CPT

## 2019-05-06 PROCEDURE — 97110 THERAPEUTIC EXERCISES: CPT

## 2019-05-06 NOTE — PROGRESS NOTES
"              HAND THERAPY/OCCUPATIONAL THERAPY PROGRESS REPORT     Date:  2019 Clinic Number: [id number]  Patient: Ashu Thomas        : 1980      Age: 38 y.o.     Sex: male                       Hand dominance: right  Referring Physician: Dr. Ferreira  RTD:  2019  Diagnosis: Complete laceration of right ulnar wrist with ulnar nerve and artery laceration and FCU, FDS, and FDP to RF and SF  Precautions:  Splint wear/care; infection control; no functional use of    Date of Injury: 19  Date of Surgery: 19 - right hand I&D with ulnar artery/laceration repair; 19 - right hand tendon/nerve repair  Surgical Procedure: Right hand I&D with ulnar artery/laceration repair on 19; Right hand tendon, nerve repair on 19    Visit #:  (new referral)  Time In: 12:00 PM  Time Out: 1:00 PM  Billable Time: 55 minutes  Charges: fluido, 3 TE    Occupation:  for [employer]  Work Status: Light duty    SUBJECTIVE     Pt reports that he was compliant with his HEP.    Functional Pain Scale Rating 0-10: 0/10 most times, 1/10 occasionally  Location: ulnar hand and volar central palm  Description: Aching and Tingling  Activities which increase pain: "If I move the wrong way"  Activities which decrease pain: not needed    OBJECTIVE     Today's Treatment: Therapeutic exercises x 35 min,  fluido x 15 min    Current Treatment: manual therapy/joint mobilization, Therapeutic exercises, Sensory re-education, Desensitization, Scar management, Edema management, Joint protection and Orthotic fitting/training    Sensation: Ulnar Nerve Impaired     [place] Chris Monofilament Test:   6.65 (Deep pressure only) -  small finger  4.56 (Loss of protective sensation)   4.31 (Diminished Protective sensation)   3.61(Diminished light touch) ring finger  2.83 (Normal) - throughout median nerve distribution  *Improvement noted on ulnar side of palm with loss of protective sensation    Scar " Assessment: Hypersensitivity in central portion of scar. Scar well healed.    Edema: Circumferential measurements: as follows: (compared to 4/8/19)   Index Middle Ring Small   Proximal Phalanx 6.8 cm (-.2) 7.0 cm (-.1) 6.8 cm (n/c) 6.1 cm (-.1)     MCP's 22.2 cm (-.3)   Distal Palmar Crease 22 cm (-1)   Proximal Wrist Crease  18.8 cm (-1.6)         AROM is as follows:    Range of Motion: Active Ext/Flex 5/6/2019 (in degrees) Changes noted in () since 4/8/2019  (Ext/Flex) Index Middle Ring Small   MCP Jt 0/85(+10) 0/85(+12/+15) 0/74(+19) 0/73 (+13)   PIP Jt 0/92(+8/+2) 0/90(+15/n/c) 29/95 (+11/n/c) 10/93(+20/+3)   DIP Jt 0/60(n/c) 0/74(+5/+12) 0/60(+5/+5) 0/70(+10/+12)   ACOSTA 237 (+20) 249(+59) 200(+50) 226(+58)     Thumb MP ext/flex 0/55  Thumb IP ext/flex 0/67    Elbow Ext:  WNL Flex:  WNL   Wrist Ext:  50(+10) Flex:  40(+10)   Wrist RD:  15 (n/c) UD:  10(n/c)   Forearm Pron:  WNL Sup:  WNL      Strength: (in pounds/psi)        Date 5/6/2019 5/6/2019    Left Right   Rung II 91 33 (+22) since 4/15/2019   Lateral pinch 28 7   Tripod pinch 30 5   Tip pinch 20 4         Ashu received the following supervised modalities after being cleared for contradictions for 15 minutes:   -Patient received  fluido for 15 minutes to increase blood flow, circulation, pain management and for tissue elasticity prior to therex.     Ashu received manual therapy for 5 minutes including:  RM and STM to right hand/wrist    Ashu received therapeutic exercises for 40 minutes including:  -PROM as follows: jt blocking, thumb opposition, finger flexion, finger extension with MPs at 90, wrist flex and wrist ext x 10 reps  -gentle AROM as follows: jt blocking, TGEs, thumb opposition, wrist flex/ext with fingers flexed and RD/UD with fingers flexed, finger ext, abd/add 2  x 10 reps  -towel scrunches x 10 reps  - 3 lb wrist 3 ways 2 x 15 reps  -large pom poms 3 containers with red cp  -small pom poms 3 containers nesting  -green putty: 2'  molding, 30 grasps, 3 logs each thumb and ring/ small finger, 5 flowers   -hand gripper 1 yellow, 1 red, 1 green rb x 30 reps  -green digi-extend x 30 reps  -red theraflex bar smiles, frowns and twists x 15 reps      Functional Limitations: Patient presents with the following functional Limitations:   Self Care / ADL: managing two handed but some difficulty with right hand  Home/Work Activities: working light duty  Leisure: working out    Home Exercises Provided: Postural exercises, passive digit flexion (ind and composite) with active extension with MCPs flexed; Edema control techniques, orthotic fabrication/fit/training, instruction in use, wear and care precautions for orthotic and patient reported good understanding of above 10 reps each, 4-6 x day.  3/11/19: Patient provided with right large isotoner glove to be worn daily/nightly for edema control. Patient purchased vibration massager to be used for sensory re-education and edema management for 5 min 1-2 x daily. Issued AROM exercises today.  3/21/2019: Issued finger ext and abd/add ex today for HEP    Education provided re: tendon, nerve, artery repair protocols, healing time frames  Ashu verbalized good understanding of education provided.   No spiritual or educational barriers to learning provided    ASSESSMENT     Patient is now 12 W 0 days s/p I&D with ulnar artery/laceration repair and 11W 2 D s/p tendon, nerve repair. Advanced strengthening activities without difficulty. Pt has made significant improvement with AROM and  strength. Sensation seems to have improved minimally in the ulnar side of his hand. Pain has decreased. He is less sensitive over the scar region and the scar is flattening. Pt is able write independently, button and lift light objects without difficulty.    Treatment Diagnosis/ Problem List RUE Decreased ROM, Decreased  strength, Decreased pinch strength, Decreased functional hand use, Increased pain, Edema, Joint Stiffness,  Scar Adhesion potential and Diminished/Impaired Sensation     Short Term Goals: Increase ROM 3-5 degrees to increase functional hand use for ADLs/work/leisure activities, Met 5/6/2019  Increase  strength 3-5 lbs. to improve functional grasp for ADLs/work/leisure activities,-Met 5/6/2019  Increase pinch 1-3 psi's to increase IND wiht button and FM Coordination Met 5/6/2019., Decrease edema .2-.3 mm to increase joint mobility/flexibility for hand use. - Met 5/6/2019    Long Term Goals: 1. Increase  strength 10 lbs to increase functional hand use for ADLs/IADLs. - Progressing  2. Pt. Will report [place] with playing sports. - progressing    Pt prognosis is Excellent. Pt will continue to benefit from skilled outpatient hand therapy to address the deficits listed in the problem list, provide pt education and to maximize pt's level of independence in the home and community environment.      PLAN:  Consult with physician. Is there weight restrictions?      Will continue/progress with established Plan of Care towards OT goals as orders are received 2 x week for 6-8 more weeks.  Thank you!    LENOEL Olvera, CHT  5/6/2019    I certify the need for these services furnished under this plan of treatment and while under my care.     ____________________________________                         __________________  Physician/Referring Practitioner                                               Date of Signature

## 2019-05-09 ENCOUNTER — CLINICAL SUPPORT (OUTPATIENT)
Dept: REHABILITATION | Facility: HOSPITAL | Age: 39
End: 2019-05-09
Attending: CLINIC/CENTER
Payer: COMMERCIAL

## 2019-05-09 DIAGNOSIS — S61.511D LACERATION OF RIGHT WRIST WITH TENDON INVOLVEMENT, SUBSEQUENT ENCOUNTER: ICD-10-CM

## 2019-05-09 DIAGNOSIS — S66.921D LACERATION OF RIGHT WRIST WITH TENDON INVOLVEMENT, SUBSEQUENT ENCOUNTER: ICD-10-CM

## 2019-05-09 DIAGNOSIS — M25.60 RANGE OF MOTION DEFICIT: Primary | ICD-10-CM

## 2019-05-09 PROCEDURE — 97022 WHIRLPOOL THERAPY: CPT

## 2019-05-09 PROCEDURE — 97110 THERAPEUTIC EXERCISES: CPT

## 2019-05-09 NOTE — PROGRESS NOTES
"              HAND THERAPY/OCCUPATIONAL THERAPY PROGRESS REPORT     Date:  2019 Clinic Number: [id number]  Patient: Ashu Thomas        : 1980      Age: 38 y.o.     Sex: male                       Hand dominance: right  Referring Physician: Dr. Ferreira  RTD:  2019  Diagnosis: Complete laceration of right ulnar wrist with ulnar nerve and artery laceration and FCU, FDS, and FDP to RF and SF  Precautions:  Splint wear/care; infection control; no functional use of    Date of Injury: 19  Date of Surgery: 19 - right hand I&D with ulnar artery/laceration repair; 19 - right hand tendon/nerve repair  Surgical Procedure: Right hand I&D with ulnar artery/laceration repair on 19; Right hand tendon, nerve repair on 19    Visit #:  (new referral)  Time In: 11:00 AM  Time Out: 12:00 PM  Billable Time: 55 minutes  Charges: fluido, 3 TE    Occupation:  for [employer]  Work Status: Light duty    SUBJECTIVE     Pt reports that he was compliant with his HEP.    Functional Pain Scale Rating 0-10: 0/10 most times, 1/10 occasionally  Location: ulnar hand and volar central palm  Description: Aching and Tingling  Activities which increase pain: "If I move the wrong way"  Activities which decrease pain: not needed    OBJECTIVE     Today's Treatment: Therapeutic exercises x 35 min,  fluido x 15 min    Current Treatment: manual therapy/joint mobilization, Therapeutic exercises, Sensory re-education, Desensitization, Scar management, Edema management, Joint protection and Orthotic fitting/training    Sensation: Ulnar Nerve Impaired     [place] Chris Monofilament Test:   6.65 (Deep pressure only) -  small finger  4.56 (Loss of protective sensation)   4.31 (Diminished Protective sensation)   3.61(Diminished light touch) ring finger  2.83 (Normal) - throughout median nerve distribution  *Improvement noted on ulnar side of palm with loss of protective sensation    Scar " Assessment: Hypersensitivity in central portion of scar. Scar well healed.    Edema: Circumferential measurements: as follows: (compared to 4/8/19)   Index Middle Ring Small   Proximal Phalanx 6.8 cm (-.2) 7.0 cm (-.1) 6.8 cm (n/c) 6.1 cm (-.1)     MCP's 22.2 cm (-.3)   Distal Palmar Crease 22 cm (-1)   Proximal Wrist Crease  18.8 cm (-1.6)         AROM is as follows:    Range of Motion: Active Ext/Flex 5/6/2019 (in degrees) Changes noted in () since 4/8/2019  (Ext/Flex) Index Middle Ring Small   MCP Jt 0/85(+10) 0/85(+12/+15) 0/74(+19) 0/73 (+13)   PIP Jt 0/92(+8/+2) 0/90(+15/n/c) 29/95 (+11/n/c) 10/93(+20/+3)   DIP Jt 0/60(n/c) 0/74(+5/+12) 0/60(+5/+5) 0/70(+10/+12)   ACOSTA 237 (+20) 249(+59) 200(+50) 226(+58)     Thumb MP ext/flex 0/55  Thumb IP ext/flex 0/67    Elbow Ext:  WNL Flex:  WNL   Wrist Ext:  50(+10) Flex:  40(+10)   Wrist RD:  15 (n/c) UD:  10(n/c)   Forearm Pron:  WNL Sup:  WNL      Strength: (in pounds/psi)        Date 5/6/2019 5/6/2019    Left Right   Rung II 91 33 (+22) since 4/15/2019   Lateral pinch 28 7   Tripod pinch 30 5   Tip pinch 20 4         Ashu received the following supervised modalities after being cleared for contradictions for 15 minutes:   -Patient received  fluido for 15 minutes to increase blood flow, circulation, pain management and for tissue elasticity prior to therex.     Ashu received manual therapy for 5 minutes including:  RM and STM to right hand/wrist    Ashu received therapeutic exercises for 40 minutes including:  -PROM as follows: jt blocking, thumb opposition, finger flexion, finger extension with MPs at 90, wrist flex and wrist ext x 10 reps  -gentle AROM as follows: jt blocking, TGEs, thumb opposition, wrist flex/ext with fingers flexed and RD/UD with fingers flexed, finger ext, abd/add 2  x 10 reps  -towel scrunches x 10 reps  - 3 lb wrist 3 ways 2 x 15 reps  -large pom poms 3 containers with red cp  -small pom poms 3 containers nesting  -green putty: 2'  molding, 30 grasps, 3 logs each thumb and ring/ small finger, 5 flowers   -hand gripper 1 yellow, 1 red, 1 green rb x 30 reps  -green digi-extend x 30 reps  -red theraflex bar smiles, frowns and twists 2  x 15 reps  -rebounder with red ball x 30 reps      Functional Limitations: Patient presents with the following functional Limitations:   Self Care / ADL: managing two handed but some difficulty with right hand  Home/Work Activities: working light duty  Leisure: working out    Home Exercises Provided: Postural exercises, passive digit flexion (ind and composite) with active extension with MCPs flexed; Edema control techniques, orthotic fabrication/fit/training, instruction in use, wear and care precautions for orthotic and patient reported good understanding of above 10 reps each, 4-6 x day.  3/11/19: Patient provided with right large isotoner glove to be worn daily/nightly for edema control. Patient purchased vibration massager to be used for sensory re-education and edema management for 5 min 1-2 x daily. Issued AROM exercises today.  3/21/2019: Issued finger ext and abd/add ex today for HEP    Education provided re: tendon, nerve, artery repair protocols, healing time frames  Ashu verbalized good understanding of education provided.   No spiritual or educational barriers to learning provided    ASSESSMENT     Patient is now 12 W 0 days s/p I&D with ulnar artery/laceration repair and 11W 2 D s/p tendon, nerve repair. Advanced strengthening activities without difficulty. Advanced strengthening exercises with no difficulty.    Treatment Diagnosis/ Problem List RUE Decreased ROM, Decreased  strength, Decreased pinch strength, Decreased functional hand use, Increased pain, Edema, Joint Stiffness, Scar Adhesion potential and Diminished/Impaired Sensation      New Short Term Goals: Increase ROM 3-5 degrees to increase functional hand use for ADLs/work/leisure activities,Met 5/6/2019 Increase  strength 3-5 lbs.  to improve functional grasp for ADLs/work/leisure activities, - Met 5/6/2019 Increase pinch 1-3 psi's to increase IND wiht button and FM Coordination. -Met 5/6/2019, Decrease edema .2-.3 mm to increase joint mobility/flexibility for hand use.   -Met 5/6/2019  Long Term Goals: 1. Increase  strength 10 lbs to increase functional hand use for ADLs/IADLs. - Progressing  2. Pt. Will report [place] with playing sports. - progressing      Pt prognosis is Excellent. Pt will continue to benefit from skilled outpatient hand therapy to address the deficits listed in the problem list, provide pt education and to maximize pt's level of independence in the home and community environment.      PLAN:  Cont with OT      Will continue/progress with established Plan of Care towards OT goals as orders are received 2 x week for 6-8 more weeks.  Thank you!    LEONEL Olvera, CHT  5/9/2019    I certify the need for these services furnished under this plan of treatment and while under my care.     ____________________________________                         __________________  Physician/Referring Practitioner                                               Date of Signature

## 2019-05-13 ENCOUNTER — CLINICAL SUPPORT (OUTPATIENT)
Dept: REHABILITATION | Facility: HOSPITAL | Age: 39
End: 2019-05-13
Attending: CLINIC/CENTER
Payer: COMMERCIAL

## 2019-05-13 DIAGNOSIS — M25.60 RANGE OF MOTION DEFICIT: Primary | ICD-10-CM

## 2019-05-13 DIAGNOSIS — S61.511D LACERATION OF RIGHT WRIST WITH TENDON INVOLVEMENT, SUBSEQUENT ENCOUNTER: ICD-10-CM

## 2019-05-13 DIAGNOSIS — S66.921D LACERATION OF RIGHT WRIST WITH TENDON INVOLVEMENT, SUBSEQUENT ENCOUNTER: ICD-10-CM

## 2019-05-13 PROCEDURE — 97022 WHIRLPOOL THERAPY: CPT

## 2019-05-13 PROCEDURE — 97110 THERAPEUTIC EXERCISES: CPT

## 2019-05-15 ENCOUNTER — CLINICAL SUPPORT (OUTPATIENT)
Dept: REHABILITATION | Facility: HOSPITAL | Age: 39
End: 2019-05-15
Attending: CLINIC/CENTER
Payer: COMMERCIAL

## 2019-05-15 DIAGNOSIS — S61.511D LACERATION OF RIGHT WRIST WITH TENDON INVOLVEMENT, SUBSEQUENT ENCOUNTER: ICD-10-CM

## 2019-05-15 DIAGNOSIS — S66.921D LACERATION OF RIGHT WRIST WITH TENDON INVOLVEMENT, SUBSEQUENT ENCOUNTER: ICD-10-CM

## 2019-05-15 DIAGNOSIS — M25.60 RANGE OF MOTION DEFICIT: Primary | ICD-10-CM

## 2019-05-15 PROCEDURE — 97022 WHIRLPOOL THERAPY: CPT

## 2019-05-15 PROCEDURE — 97110 THERAPEUTIC EXERCISES: CPT

## 2019-05-15 NOTE — PROGRESS NOTES
"              HAND THERAPY/OCCUPATIONAL THERAPY PROGRESS REPORT     Date:  05/15/2019 Clinic Number: [id number]  Patient: Ashu Thomas        : 1980      Age: 38 y.o.     Sex: male                       Hand dominance: right  Referring Physician: Dr. Ferreira  RTD:  2019  Diagnosis: Complete laceration of right ulnar wrist with ulnar nerve and artery laceration and FCU, FDS, and FDP to RF and SF  Precautions:  Splint wear/care; infection control; no functional use of    Date of Injury: 19  Date of Surgery: 19 - right hand I&D with ulnar artery/laceration repair; 19 - right hand tendon/nerve repair  Surgical Procedure: Right hand I&D with ulnar artery/laceration repair on 19; Right hand tendon, nerve repair on 19    Visit #:  (new referral)  Time In: 2:15 PM  Time Out: 3:15 PM  Billable Time: 55 minutes  Charges: fluido, 3 TE    Occupation:  for [employer]  Work Status: Light duty    SUBJECTIVE     Pt reports that he was compliant with his HEP.    Functional Pain Scale Rating 0-10: 0/10 most times, 1/10 occasionally  Location: ulnar hand and volar central palm  Description: Aching and Tingling  Activities which increase pain: "If I move the wrong way"  Activities which decrease pain: not needed    OBJECTIVE     Today's Treatment: Therapeutic exercises x 40 min,  fluido x 15 min    Current Treatment: manual therapy/joint mobilization, Therapeutic exercises, Sensory re-education, Desensitization, Scar management, Edema management, Joint protection and Orthotic fitting/training    Sensation: Ulnar Nerve Impaired     [place] Chris Monofilament Test:   6.65 (Deep pressure only) -  small finger  4.56 (Loss of protective sensation)   4.31 (Diminished Protective sensation)   3.61(Diminished light touch) ring finger  2.83 (Normal) - throughout median nerve distribution  *Improvement noted on ulnar side of palm with loss of protective sensation    Scar " Assessment: Hypersensitivity in central portion of scar. Scar well healed.    Edema: Circumferential measurements: as follows: (compared to 4/8/19)   Index Middle Ring Small   Proximal Phalanx 6.8 cm (-.2) 7.0 cm (-.1) 6.8 cm (n/c) 6.1 cm (-.1)     MCP's 22.2 cm (-.3)   Distal Palmar Crease 22 cm (-1)   Proximal Wrist Crease  18.8 cm (-1.6)         AROM is as follows:    Range of Motion: Active Ext/Flex 5/6/2019 (in degrees) Changes noted in () since 4/8/2019  (Ext/Flex) Index Middle Ring Small   MCP Jt 0/85(+10) 0/85(+12/+15) 0/74(+19) 0/73 (+13)   PIP Jt 0/92(+8/+2) 0/90(+15/n/c) 29/95 (+11/n/c) 10/93(+20/+3)   DIP Jt 0/60(n/c) 0/74(+5/+12) 0/60(+5/+5) 0/70(+10/+12)   ACOSTA 237 (+20) 249(+59) 200(+50) 226(+58)     Thumb MP ext/flex 0/55  Thumb IP ext/flex 0/67    Elbow Ext:  WNL Flex:  WNL   Wrist Ext:  50(+10) Flex:  40(+10)   Wrist RD:  15 (n/c) UD:  10(n/c)   Forearm Pron:  WNL Sup:  WNL      Strength: (in pounds/psi)        Date 5/6/2019 5/6/2019    Left Right   Rung II 91 33 (+22) since 4/15/2019   Lateral pinch 28 7   Tripod pinch 30 5   Tip pinch 20 4         Ashu received the following supervised modalities after being cleared for contradictions for 15 minutes:   -Patient received  fluido for 15 minutes to increase blood flow, circulation, pain management and for tissue elasticity prior to therex.     Ashu received manual therapy for 5 minutes including:  RM and STM to right hand/wrist    Ashu received therapeutic exercises for 40 minutes including:  -PROM as follows: jt blocking, thumb opposition, finger flexion, finger extension with MPs at 90, wrist flex and wrist ext x 10 reps  -gentle AROM as follows: jt blocking, TGEs, thumb opposition, wrist flex/ext with fingers flexed and RD/UD with fingers flexed, finger ext, abd/add 2  x 10 reps  -towel scrunches x 10 reps  - 3 lb wrist 3 ways 2 x 15 reps  -large pom poms 3 containers with red cp  -small pom poms 3 containers nesting  -green putty: 2'  molding, 30 grasps, 3 logs each thumb and ring/ small finger, 5 flowers   -Metal hand gripper 3rd rung x 30 reps  -green digi-extend x 30 reps  -red theraflex bar smiles, frowns and twists 2  x 15 reps  -rebounder with red ball x 30 reps      Functional Limitations: Patient presents with the following functional Limitations:   Self Care / ADL: managing two handed but some difficulty with right hand  Home/Work Activities: working light duty  Leisure: working out    Home Exercises Provided: Postural exercises, passive digit flexion (ind and composite) with active extension with MCPs flexed; Edema control techniques, orthotic fabrication/fit/training, instruction in use, wear and care precautions for orthotic and patient reported good understanding of above 10 reps each, 4-6 x day.  3/11/19: Patient provided with right large isotoner glove to be worn daily/nightly for edema control. Patient purchased vibration massager to be used for sensory re-education and edema management for 5 min 1-2 x daily. Issued AROM exercises today.  3/21/2019: Issued finger ext and abd/add ex today for HEP    Education provided re: tendon, nerve, artery repair protocols, healing time frames  Ashu verbalized good understanding of education provided.   No spiritual or educational barriers to learning provided    ASSESSMENT     Patient is now 12 W 2 days s/p I&D with ulnar artery/laceration repair and 11W 4 D s/p tendon, nerve repair. Continued strengthening activities without difficulty.     Treatment Diagnosis/ Problem List RUE Decreased ROM, Decreased  strength, Decreased pinch strength, Decreased functional hand use, Increased pain, Edema, Joint Stiffness, Scar Adhesion potential and Diminished/Impaired Sensation     Short Term Goals: Increase ROM 3-5 degrees to increase functional hand use for ADLs/work/leisure activities,- Met 5/6/2019 Increase  strength 3-5 lbs. to improve functional grasp for ADLs/work/leisure activities, - Met  5/6/2019 Increase pinch 1-3 psi's to increase IND wiht button and FM Coordination.,- Met 5/6/2019 Decrease edema .2-.3 mm to increase joint mobility/flexibility for hand use. - Met 5/6/2019     Long Term Goals: 1. Increase  strength 10 lbs to increase functional hand use for ADLs/IADLs. - Progressing  2. Pt. Will report [place] with playing sports. - progressing      Pt prognosis is Excellent. Pt will continue to benefit from skilled outpatient hand therapy to address the deficits listed in the problem list, provide pt education and to maximize pt's level of independence in the home and community environment.      PLAN:  Cont with OT. Re-assess next session.      Will continue/progress with established Plan of Care towards OT goals as orders are received 2 x week for 6-8 more weeks.  Thank you!    LEONEL Olvera, CHT  5/15/2019    I certify the need for these services furnished under this plan of treatment and while under my care.     ____________________________________                         __________________  Physician/Referring Practitioner                                               Date of Signature

## 2019-05-24 ENCOUNTER — CLINICAL SUPPORT (OUTPATIENT)
Dept: REHABILITATION | Facility: HOSPITAL | Age: 39
End: 2019-05-24
Attending: CLINIC/CENTER
Payer: COMMERCIAL

## 2019-05-24 DIAGNOSIS — M25.60 RANGE OF MOTION DEFICIT: Primary | ICD-10-CM

## 2019-05-24 PROCEDURE — 97530 THERAPEUTIC ACTIVITIES: CPT

## 2019-05-24 PROCEDURE — 97110 THERAPEUTIC EXERCISES: CPT

## 2019-05-24 PROCEDURE — 97022 WHIRLPOOL THERAPY: CPT

## 2019-05-24 NOTE — PROGRESS NOTES
"              HAND THERAPY/OCCUPATIONAL Treatment Note     Date:  2019 Clinic Number: [id number]  Patient: Ashu Thomas        : 1980      Age: 38 y.o.     Sex: male                       Hand dominance: right  Referring Physician: Dr. Ferreira  RTD:  2019  Diagnosis: Complete laceration of right ulnar wrist with ulnar nerve and artery laceration and FCU, FDS, and FDP to RF and SF  Precautions:  Splint wear/care; infection control; no functional use of    Date of Injury: 19  Date of Surgery: 19 - right hand I&D with ulnar artery/laceration repair; 19 - right hand tendon/nerve repair  Surgical Procedure: Right hand I&D with ulnar artery/laceration repair on 19; Right hand tendon, nerve repair on 19    Visit #:  (new referral)  Time In: 9:20 AM  Time Out: 10:35 AM  Total Treatment Time: 75 minutes  Billable Time: 60 minutes  Charges: fluido, 1 TE, 2TA,    Occupation:  for [employer]  Work Status: Light duty    SUBJECTIVE     Pt reports that he was compliant with his HEP. Pt. Reports he is using his hand more when doing activities at home.     Functional Pain Scale Rating 0-10: 0/10 most times, 1/10 occasionally  Location: ulnar hand and volar central palm  Description: Aching and Tingling  Activities which increase pain: "If I move the wrong way"  Activities which decrease pain: not needed    OBJECTIVE     Today's Treatment: Therapeutic exercises x 10 min,  TA x 30 min, fluido x 15 min    Current Treatment: manual therapy/joint mobilization, Therapeutic exercises, Sensory re-education, Desensitization, Scar management, Edema management, Joint protection and Orthotic fitting/training    Sensation: Ulnar Nerve Impaired     [place] Chris Monofilament Test:   6.65 (Deep pressure only) -  small finger  4.56 (Loss of protective sensation)   4.31 (Diminished Protective sensation)   3.61(Diminished light touch) ring finger  2.83 (Normal) - throughout " median nerve distribution  *Improvement noted on ulnar side of palm with loss of protective sensation    Scar Assessment: Hypersensitivity in central portion of scar. Scar well healed.    Edema: Circumferential measurements: as follows: (compared to 4/8/19)   Index Middle Ring Small   Proximal Phalanx 6.8 cm (-.2) 7.0 cm (-.1) 6.8 cm (n/c) 6.1 cm (-.1)     MCP's 22.2 cm (-.3)   Distal Palmar Crease 22 cm (-1)   Proximal Wrist Crease  18.8 cm (-1.6)         AROM is as follows:    Range of Motion: Active Ext/Flex 5/6/2019 (in degrees) Changes noted in () since 4/8/2019  (Ext/Flex) Index Middle Ring Small   MCP Jt 0/85(+10) 0/85(+12/+15) 0/74(+19) 0/73 (+13)   PIP Jt 0/92(+8/+2) 0/90(+15/n/c) 29/95 (+11/n/c) 10/93(+20/+3)   DIP Jt 0/60(n/c) 0/74(+5/+12) 0/60(+5/+5) 0/70(+10/+12)   ACOSTA 237 (+20) 249(+59) 200(+50) 226(+58)     Thumb MP ext/flex 0/55  Thumb IP ext/flex 0/67    Elbow Ext:  WNL Flex:  WNL   Wrist Ext:  50(+10) Flex:  40(+10)   Wrist RD:  15 (n/c) UD:  10(n/c)   Forearm Pron:  WNL Sup:  WNL      Strength: (in pounds/psi)        Date 5/6/2019 5/6/2019    Left Right   Rung II 91 33 (+22) since 4/15/2019   Lateral pinch 28 7   Tripod pinch 30 5   Tip pinch 20 4         Ashu received the following supervised modalities after being cleared for contradictions for 15 minutes:   -Patient received  fluido for 15 minutes to increase blood flow, circulation, pain management and for tissue elasticity prior to therex.     Ashu received manual therapy for 5 minutes including:  RM and STM to right hand/wrist    Ashu received therapeutic exercises for 10 minutes including:  -gentle AROM as follows: jt blocking, TGEs, thumb opposition, wrist flex/ext with fingers flexed and RD/UD with fingers flexed, finger ext, abd/add 2  x 10 reps    Ashu participated in Therapeutic activities for 30 minutes including:  -towel scrunches x 10 reps  - 5 lb wrist 3 ways 2 x 15 reps for lifting activities  -large pom poms 3 containers  with red cp for pinching  -small pom poms 3 containers nesting for manipulating  -green putty: 2' molding, 30 grasps, 3 logs each thumb and ring/ small finger, 5 flowers for grasping and pinching  -Metal hand gripper 4th rung x 30 reps for grasping  -green digi-extend x 30 reps for releasing objects  -green theraflex bar smiles, frowns and twists 2  x 15 reps  -rebounder with red ball x 30 reps    Custom fabricated hand based ring and small finger extension orthosis for night use only. Pt instructed on wear, care and precautions. He verbalized good understanding.    Functional Limitations: Patient presents with the following functional Limitations:   Self Care / ADL: managing two handed but some difficulty with right hand  Home/Work Activities: working light duty  Leisure: working out    Home Exercises Provided: Postural exercises, passive digit flexion (ind and composite) with active extension with MCPs flexed; Edema control techniques, orthotic fabrication/fit/training, instruction in use, wear and care precautions for orthotic and patient reported good understanding of above 10 reps each, 4-6 x day.  3/11/19: Patient provided with right large isotoner glove to be worn daily/nightly for edema control. Patient purchased vibration massager to be used for sensory re-education and edema management for 5 min 1-2 x daily. Issued AROM exercises today.  3/21/2019: Issued finger ext and abd/add ex today for HEP    Education provided re: tendon, nerve, artery repair protocols, healing time frames  Ashu verbalized good understanding of education provided.   No spiritual or educational barriers to learning provided    ASSESSMENT     Patient is now 13 W s/p I&D with ulnar artery/laceration repair and 12W 2 D s/p tendon, nerve repair. Advanced strengthening activities without difficulty. Pt presents with more wrist movement when completing activities during therapy.     Treatment Diagnosis/ Problem List RUE Decreased ROM,  "Decreased  strength, Decreased pinch strength, Decreased functional hand use, Increased pain, Edema, Joint Stiffness, Scar Adhesion potential and Diminished/Impaired Sensation     Short Term Goals: Increase ROM 3-5 degrees to increase functional hand use for ADLs/work/leisure activities,- Met 5/6/2019 Increase  strength 3-5 lbs. to improve functional grasp for ADLs/work/leisure activities, - Met 5/6/2019 Increase pinch 1-3 psi's to increase IND wiht button and FM Coordination.,- Met 5/6/2019 Decrease edema .2-.3 mm to increase joint mobility/flexibility for hand use. - Met 5/6/2019     Long Term Goals: 1. Increase  strength 10 lbs to increase functional hand use for ADLs/IADLs. - Progressing  2. Pt. Will report [place] with playing sports. - progressing      Pt prognosis is Excellent. Pt will continue to benefit from skilled outpatient hand therapy to address the deficits listed in the problem list, provide pt education and to maximize pt's level of independence in the home and community environment.      PLAN:     Next session will complete a re-eval to measure progress.    Will continue/progress with established Plan of Care towards OT goals as orders are received 2 x week for 6-8 more weeks.  Thank you!    Michelle Olivera  "I certify that I was present in the room directing the student in service delivery and guiding them using my skilled judgement. As the co-signing therapist, I have reviewed the student's documentation and am responsible for the treatment, assessment and plan."  Therapist: LEONEL Olvera CHT  Occupational Therapist  Certified Hand Therapist  LEONEL Olvera CHT  5/24/2019    I certify the need for these services furnished under this plan of treatment and while under my care.     ____________________________________                         __________________  Physician/Referring Practitioner                                               Date of Signature                 "

## 2019-05-28 ENCOUNTER — CLINICAL SUPPORT (OUTPATIENT)
Dept: REHABILITATION | Facility: HOSPITAL | Age: 39
End: 2019-05-28
Attending: CLINIC/CENTER
Payer: COMMERCIAL

## 2019-05-28 DIAGNOSIS — M79.641 PAIN OF RIGHT HAND: ICD-10-CM

## 2019-05-28 DIAGNOSIS — M25.60 RANGE OF MOTION DEFICIT: Primary | ICD-10-CM

## 2019-05-28 PROCEDURE — 97530 THERAPEUTIC ACTIVITIES: CPT

## 2019-05-28 PROCEDURE — 97022 WHIRLPOOL THERAPY: CPT

## 2019-05-28 PROCEDURE — 97110 THERAPEUTIC EXERCISES: CPT

## 2019-05-28 NOTE — PROGRESS NOTES
"              HAND THERAPY/OCCUPATIONAL Progress Note     Date:  2019 Clinic Number: [id number]  Patient: Ashu Thomas        : 1980      Age: 38 y.o.     Sex: male                       Hand dominance: right  Referring Physician: Dr. Ferreira  RTD:  2019  Diagnosis: Complete laceration of right ulnar wrist with ulnar nerve and artery laceration and FCU, FDS, and FDP to RF and SF  Precautions:  Splint wear/care; infection control; no functional use of    Date of Injury: 19  Date of Surgery: 19 - right hand I&D with ulnar artery/laceration repair; 19 - right hand tendon/nerve repair  Surgical Procedure: Right hand I&D with ulnar artery/laceration repair on 19; Right hand tendon, nerve repair on 19    Visit #:  (new referral)  Time In: 11:00 AM  Time Out: 12:05 PM  Total Treatment Time: 65 minutes  Billable Time: 40 minutes  Charges: fluido, 1 TE, 2TA,    Occupation:  for [employer]  Work Status: Light duty    SUBJECTIVE     Pt reports that he was compliant with his HEP. Pt. Reports he notices improvement in his hand function and wants to start swinging a golf club soon. Pt reported that he was able to throw a t-ball.    Functional Pain Scale Rating 0-10: 0/10 most times, 1/10 occasionally  Location: ulnar hand and volar central palm  Description: Aching and Tingling  Activities which increase pain: "If I move the wrong way"  Activities which decrease pain: not needed    OBJECTIVE       Current Treatment: manual therapy/joint mobilization, Therapeutic exercises, Sensory re-education, Desensitization, Scar management, Edema management, Joint protection and Orthotic fitting/training    Sensation: Ulnar Nerve Impaired     [place] Chris Monofilament Test:   6.65 (Deep pressure only) -  small finger  4.56 (Loss of protective sensation)   4.31 (Diminished Protective sensation)   3.61(Diminished light touch) ring finger  2.83 (Normal) - throughout " median nerve distribution  *Improvement noted on ulnar side of palm with loss of protective sensation    Scar Assessment: Hypersensitivity in central portion of scar. Scar well healed.    Edema: Circumferential measurements: as follows: (compared to 5/6/19)   Index Middle Ring Small   Proximal Phalanx 6.8 cm (n/c) 7.0 cm (n/c) 6.8 cm (n/c) 6.1 cm (-.1)     MCP's 22.0 cm (-.2)   Distal Palmar Crease 21.8 cm (-3)   Proximal Wrist Crease  19 cm (+.2)         AROM is as follows:    Range of Motion: Active Ext/Flex 5/23/2019 (in degrees) Changes noted in () since 5/6/2019  (Ext/Flex) Index Middle Ring Small   MCP Jt 0/90(+5) 0/87(+2) 0/76(+2) 0/82 (+9)   PIP Jt 0/98(+6) 0/95(+5) 19/100 (-10/+5) 0/95(-10/+2)   DIP Jt 0/65(+5)) 0/74(n/c) 0/62(+5/+2) 0/70(n/c)   ACOSTA 253 (+16) 256(+7) 210(+19) 247(+21)     Thumb MP ext/flex 0/60  Thumb IP ext/flex 0/72    Elbow Ext:  WNL Flex:  WNL   Wrist Ext:  65(+15) Flex:  50(+10)   Wrist RD:  15 (n/c) UD:  15(+5))   Forearm Pron:  WNL Sup:  WNL      Strength: (in pounds/psi)    Date 5/6/2019 5/6/2019 5/28/2019    Left Right Right   Rung II 91 33 (+22) since 4/15/2019 45   Lateral pinch 28 7 7   Tripod pinch 30 5 5   Tip pinch 20 4 4       CMS Impairment/Limitation/Restriction for FOTO Hand Survey    Therapist reviewed FOTO scores for Ashu Thomas on 5/28/2019.   FOTO documents entered into EP[employer] - see Media section.    Limitation Score: 46%  Category: Carrying    Current : CK = at least 40% but < 60% impaired, limited or restricted  Goal: CK = at least 40% but < 60% impaired, limited or restricted               Ashu received the following supervised modalities after being cleared for contradictions for 15 minutes:   -Patient received  fluido for 15 minutes to increase blood flow, circulation, pain management and for tissue elasticity prior to therex.     Ashu received manual therapy for 5 minutes including:  RM and STM to right hand/wrist    Ashu received therapeutic  exercises for 10 minutes including:  -gentle AROM as follows: jt blocking, TGEs, thumb opposition, wrist flex/ext with fingers flexed and RD/UD with fingers flexed, finger ext, abd/add 2  x 10 reps    Ashu participated in Therapeutic activities for 30 minutes including:  -towel scrunches x 10 reps  - 5 lb wrist 3 ways 2 x 15 reps for lifting activities  -large pom poms 3 containers with red cp for pinching  -small pom poms 3 containers nesting for manipulating  -green putty: 2' molding, 30 grasps, 3 logs each thumb and ring/ small finger, 5 flowers for grasping and pinching  -Metal hand gripper 4th rung x 30 reps for grasping  -green digi-extend x 30 reps for releasing objects  -green theraflex bar smiles, frowns and twists 2  x 15 reps  -rebounder with red ball x 30 reps for throwing  -finger ADD with yellow sponge 15 reps for grasping     Functional Limitations: Patient presents with the following functional Limitations:   Self Care / ADL: managing two handed but some difficulty with right hand  Home/Work Activities: working light duty  Leisure: working out    Home Exercises Provided: Postural exercises, passive digit flexion (ind and composite) with active extension with MCPs flexed; Edema control techniques, orthotic fabrication/fit/training, instruction in use, wear and care precautions for orthotic and patient reported good understanding of above 10 reps each, 4-6 x day.  3/11/19: Patient provided with right large isotoner glove to be worn daily/nightly for edema control. Patient purchased vibration massager to be used for sensory re-education and edema management for 5 min 1-2 x daily. Issued AROM exercises today.  3/21/2019: Issued finger ext and abd/add ex today for HEP    Education provided re: tendon, nerve, artery repair protocols, healing time frames  sAhu verbalized good understanding of education provided.   No spiritual or educational barriers to learning provided    ASSESSMENT     Patient is now  13 W 4 days s/p I&D with ulnar artery/laceration repair and 12W 6 D s/p tendon, nerve repair. Advanced strengthening activities without difficulty. Pt's hand ROM,  and pinch strength was measured today. He shows increased hand ROM and  strength. His pinch strength did not change. Pt presents with more fluid wrist movement when completing activities during therapy. Pt was given yellow sponge and red sponge to take home for Finger ADD exercise. Pt instructed to use yellow sponge first, then advance to red sponge when the yellow sponge becomes easy. Pt's FOTO score improved by 50 points. Pt is using his hand now for recreational activities. Sensation remains unchanged since last progress note.     Treatment Diagnosis/ Problem List RUE Decreased ROM, Decreased  strength, Decreased pinch strength, Decreased functional hand use, Increased pain, Edema, Joint Stiffness, Scar Adhesion potential and Diminished/Impaired Sensation     Short Term Goals: Increase ROM 3-5 degrees to increase functional hand use for ADLs/work/leisure activities,- Met 5/6/2019 Increase  strength 3-5 lbs. to improve functional grasp for ADLs/work/leisure activities, - Met 5/6/2019 Increase pinch 1-3 psi's to increase IND wiht button and FM Coordination.,- Met 5/6/2019 Decrease edema .2-.3 mm to increase joint mobility/flexibility for hand use. - Met 5/6/2019     Long Term Goals: 1. Increase  strength 10 lbs to increase functional hand use for ADLs/IADLs. - Met 5/28/2019  2. Pt. Will report [place] with playing sports. - progressing      Pt prognosis is Excellent. Pt will continue to benefit from skilled outpatient hand therapy to address the deficits listed in the problem list, provide pt education and to maximize pt's level of independence in the home and community environment.      PLAN:      Will continue/progress with established Plan of Care towards OT goals as orders are received 2 x week for 6-8 more weeks.   "    Michelle Olivera  "I certify that I was present in the room directing the student in service delivery and guiding them using my skilled judgement. As the co-signing therapist, I have reviewed the student's documentation and am responsible for the treatment, assessment and plan."  Therapist: LEONEL Olvera CHT  Occupational Therapist  Certified Hand Therapist  LEONEL Olvera CHT  5/28/2019    I certify the need for these services furnished under this plan of treatment and while under my care.     ____________________________________                         __________________  Physician/Referring Practitioner                                               Date of Signature                 "

## 2019-05-30 ENCOUNTER — CLINICAL SUPPORT (OUTPATIENT)
Dept: REHABILITATION | Facility: HOSPITAL | Age: 39
End: 2019-05-30
Attending: CLINIC/CENTER
Payer: COMMERCIAL

## 2019-05-30 DIAGNOSIS — M25.60 RANGE OF MOTION DEFICIT: Primary | ICD-10-CM

## 2019-05-30 PROCEDURE — 97110 THERAPEUTIC EXERCISES: CPT

## 2019-05-30 PROCEDURE — 97022 WHIRLPOOL THERAPY: CPT

## 2019-05-30 PROCEDURE — 97530 THERAPEUTIC ACTIVITIES: CPT

## 2019-05-30 NOTE — PROGRESS NOTES
"              HAND THERAPY/OCCUPATIONAL Daily Treatment Note     Date:  2019 Clinic Number: [id number]  Patient: Ashu Thomas        : 1980      Age: 38 y.o.     Sex: male                       Hand dominance: right  Referring Physician: Dr. Ferreira  RTD:  2019  Diagnosis: Complete laceration of right ulnar wrist with ulnar nerve and artery laceration and FCU, FDS, and FDP to RF and SF  Precautions:  Splint wear/care; infection control; no functional use of    Date of Injury: 19  Date of Surgery: 19 - right hand I&D with ulnar artery/laceration repair; 19 - right hand tendon/nerve repair  Surgical Procedure: Right hand I&D with ulnar artery/laceration repair on 19; Right hand tendon, nerve repair on 19    Visit #:  (new referral)  Time In: 10:50 AM  Time Out: 11:50 AM  Total Treatment Time: 40 minutes  Charges: fluido, 1 TE, 2TA,    Occupation:  for [employer]  Work Status: Light duty    SUBJECTIVE     Pt reports that he was compliant with his HEP. Pt. Reports he notices improvement in his hand function and wants to start swinging a golf club soon. Pt reported that he was able to throw a t-ball.    Functional Pain Scale Rating 0-10: 0/10 most times, 1/10 occasionally  Location: ulnar hand and volar central palm  Description: Aching and Tingling  Activities which increase pain: "If I move the wrong way"  Activities which decrease pain: not needed    OBJECTIVE       Current Treatment: manual therapy/joint mobilization, Therapeutic exercises, Sensory re-education, Desensitization, Scar management, Edema management, Joint protection and Orthotic fitting/training    Sensation: Ulnar Nerve Impaired     [place] Chris Monofilament Test:   6.65 (Deep pressure only) -  small finger  4.56 (Loss of protective sensation)   4.31 (Diminished Protective sensation)   3.61(Diminished light touch) ring finger  2.83 (Normal) - throughout median nerve " distribution  *Improvement noted on ulnar side of palm with loss of protective sensation    Scar Assessment: Hypersensitivity in central portion of scar. Scar well healed.    Edema: Circumferential measurements: as follows: (compared to 5/6/19)   Index Middle Ring Small   Proximal Phalanx 6.8 cm (n/c) 7.0 cm (n/c) 6.8 cm (n/c) 6.1 cm (-.1)     MCP's 22.0 cm (-.2)   Distal Palmar Crease 21.8 cm (-3)   Proximal Wrist Crease  19 cm (+.2)         AROM is as follows:    Range of Motion: Active Ext/Flex 5/23/2019 (in degrees) Changes noted in () since 5/6/2019  (Ext/Flex) Index Middle Ring Small   MCP Jt 0/90(+5) 0/87(+2) 0/76(+2) 0/82 (+9)   PIP Jt 0/98(+6) 0/95(+5) 19/100 (-10/+5) 0/95(-10/+2)   DIP Jt 0/65(+5)) 0/74(n/c) 0/62(+5/+2) 0/70(n/c)   ACOSTA 253 (+16) 256(+7) 210(+19) 247(+21)     Thumb MP ext/flex 0/60  Thumb IP ext/flex 0/72    Elbow Ext:  WNL Flex:  WNL   Wrist Ext:  65(+15) Flex:  50(+10)   Wrist RD:  15 (n/c) UD:  15(+5))   Forearm Pron:  WNL Sup:  WNL      Strength: (in pounds/psi)    Date 5/6/2019 5/6/2019 5/28/2019    Left Right Right   Rung II 91 33 (+22) since 4/15/2019 45   Lateral pinch 28 7 7   Tripod pinch 30 5 5   Tip pinch 20 4 4             Ashu received the following supervised modalities after being cleared for contradictions for 15 minutes:   -Patient received  fluido for 15 minutes to increase blood flow, circulation, pain management and for tissue elasticity prior to therex.     Ashu received manual therapy for 5 minutes including:  RM and STM to right hand/wrist    Ashu received therapeutic exercises for 10 minutes including:  -gentle AROM as follows: jt blocking, TGEs, thumb opposition, wrist flex/ext with fingers flexed and RD/UD with fingers flexed, finger ext, abd/add 2  x 10 reps    Ashu participated in Therapeutic activities for 30 minutes including:  -towel scrunches x 10 reps  - 5 lb wrist 3 ways 2 x 15 reps for lifting activities  -large pom poms 3 containers with red cp  for pinching  -small pom poms 3 containers nesting for manipulating  -green putty: 2' molding, 30 grasps, 3 logs each thumb and ring/ small finger, 5 flowers for grasping and pinching  -Metal hand gripper 4th rung x 30 reps for grasping  -green digi-extend x 30 reps for releasing objects  -green theraflex bar smiles, frowns and twists 2  x 15 reps  -rebounder with red ball x 30 reps for throwing  -finger ADD with yellow sponge 15 reps for grasping   -golf swings x 15 reps    Functional Limitations: Patient presents with the following functional Limitations:   Self Care / ADL: managing two handed but some difficulty with right hand  Home/Work Activities: working light duty  Leisure: working out    Home Exercises Provided: Postural exercises, passive digit flexion (ind and composite) with active extension with MCPs flexed; Edema control techniques, orthotic fabrication/fit/training, instruction in use, wear and care precautions for orthotic and patient reported good understanding of above 10 reps each, 4-6 x day.  3/11/19: Patient provided with right large isotoner glove to be worn daily/nightly for edema control. Patient purchased vibration massager to be used for sensory re-education and edema management for 5 min 1-2 x daily. Issued AROM exercises today.  3/21/2019: Issued finger ext and abd/add ex today for HEP    Education provided re: tendon, nerve, artery repair protocols, healing time frames  Ashu verbalized good understanding of education provided.   No spiritual or educational barriers to learning provided    ASSESSMENT     Patient is now 13 W 4 days s/p I&D with ulnar artery/laceration repair and 12W 6 D s/p tendon, nerve repair. Advanced strengthening activities without difficulty. Pt performed all activities without difficulty today. Practiced his golf swing with good reports from pt.    Treatment Diagnosis/ Problem List RUE Decreased ROM, Decreased  strength, Decreased pinch strength, Decreased  functional hand use, Increased pain, Edema, Joint Stiffness, Scar Adhesion potential and Diminished/Impaired Sensation     Short Term Goals: Increase ROM 3-5 degrees to increase functional hand use for ADLs/work/leisure activities,- Met 5/6/2019 Increase  strength 3-5 lbs. to improve functional grasp for ADLs/work/leisure activities, - Met 5/6/2019 Increase pinch 1-3 psi's to increase IND wiht button and FM Coordination.,- Met 5/6/2019 Decrease edema .2-.3 mm to increase joint mobility/flexibility for hand use. - Met 5/6/2019     Long Term Goals: 1. Increase  strength 10 lbs to increase functional hand use for ADLs/IADLs. - Met 5/28/2019  2. Pt. Will report [place] with playing sports. - progressing      Pt prognosis is Excellent. Pt will continue to benefit from skilled outpatient hand therapy to address the deficits listed in the problem list, provide pt education and to maximize pt's level of independence in the home and community environment.      PLAN:      Will continue/progress with established Plan of Care towards OT goals as orders are received 2 x week for 6-8 more weeks.

## 2019-06-03 ENCOUNTER — CLINICAL SUPPORT (OUTPATIENT)
Dept: REHABILITATION | Facility: HOSPITAL | Age: 39
End: 2019-06-03
Attending: CLINIC/CENTER
Payer: COMMERCIAL

## 2019-06-03 DIAGNOSIS — M25.60 RANGE OF MOTION DEFICIT: Primary | ICD-10-CM

## 2019-06-03 DIAGNOSIS — M79.641 PAIN OF RIGHT HAND: ICD-10-CM

## 2019-06-03 PROCEDURE — 97110 THERAPEUTIC EXERCISES: CPT

## 2019-06-03 PROCEDURE — 97530 THERAPEUTIC ACTIVITIES: CPT

## 2019-06-03 PROCEDURE — 97022 WHIRLPOOL THERAPY: CPT

## 2019-06-03 NOTE — PROGRESS NOTES
"              HAND THERAPY/OCCUPATIONAL Daily Treatment Note     Date:  2019 Clinic Number: [id number]  Patient: Ashu Thomas        : 1980      Age: 38 y.o.     Sex: male                       Hand dominance: right  Referring Physician: Dr. Ferreira  RTD:  2019  Diagnosis: Complete laceration of right ulnar wrist with ulnar nerve and artery laceration and FCU, FDS, and FDP to RF and SF  Precautions:  Splint wear/care; infection control; no functional use of    Date of Injury: 19  Date of Surgery: 19 - right hand I&D with ulnar artery/laceration repair; 19 - right hand tendon/nerve repair  Surgical Procedure: Right hand I&D with ulnar artery/laceration repair on 19; Right hand tendon, nerve repair on 19    Visit #:  (new referral)  Time In: 11:50 AM  Time Out: 12:45 PM  Total Treatment Time: 40 minutes  Charges: fluido, 1 TE, 2TA,    Occupation:  for [employer]  Work Status: Light duty    SUBJECTIVE     Pt reports that he was compliant with his HEP. Pt reports that it is getting easier to complete activities that use both hands    Functional Pain Scale Rating 0-10: 0/10 most times, 1/10 occasionally  Location: ulnar hand and volar central palm  Description: Aching and Tingling  Activities which increase pain: "If I move the wrong way"  Activities which decrease pain: not needed    OBJECTIVE       Current Treatment: manual therapy/joint mobilization, Therapeutic exercises, Sensory re-education, Desensitization, Scar management, Edema management, Joint protection and Orthotic fitting/training    Sensation: Ulnar Nerve Impaired     [place] Chris Monofilament Test:   6.65 (Deep pressure only) -  small finger  4.56 (Loss of protective sensation)   4.31 (Diminished Protective sensation)   3.61(Diminished light touch) ring finger  2.83 (Normal) - throughout median nerve distribution  *Improvement noted on ulnar side of palm with loss of protective " sensation    Scar Assessment: Hypersensitivity in central portion of scar. Scar well healed.    Edema: Circumferential measurements: as follows: (compared to 5/6/19)   Index Middle Ring Small   Proximal Phalanx 6.8 cm (n/c) 7.0 cm (n/c) 6.8 cm (n/c) 6.1 cm (-.1)     MCP's 22.0 cm (-.2)   Distal Palmar Crease 21.8 cm (-3)   Proximal Wrist Crease  19 cm (+.2)         AROM is as follows:    Range of Motion: Active Ext/Flex 5/23/2019 (in degrees) Changes noted in () since 5/6/2019  (Ext/Flex) Index Middle Ring Small   MCP Jt 0/90(+5) 0/87(+2) 0/76(+2) 0/82 (+9)   PIP Jt 0/98(+6) 0/95(+5) 19/100 (-10/+5) 0/95(-10/+2)   DIP Jt 0/65(+5)) 0/74(n/c) 0/62(+5/+2) 0/70(n/c)   ACOSTA 253 (+16) 256(+7) 210(+19) 247(+21)     Thumb MP ext/flex 0/60  Thumb IP ext/flex 0/72    Elbow Ext:  WNL Flex:  WNL   Wrist Ext:  65(+15) Flex:  50(+10)   Wrist RD:  15 (n/c) UD:  15(+5))   Forearm Pron:  WNL Sup:  WNL      Strength: (in pounds/psi)    Date 5/6/2019 5/6/2019 5/28/2019    Left Right Right   Rung II 91 33 (+22) since 4/15/2019 45   Lateral pinch 28 7 7   Tripod pinch 30 5 5   Tip pinch 20 4 4             Ashu received the following supervised modalities after being cleared for contradictions for 15 minutes:   -Patient received  fluido for 15 minutes to increase blood flow, circulation, pain management and for tissue elasticity prior to therex.     Ashu received manual therapy for 5 minutes including:  RM and STM to right hand/wrist    Ashu received therapeutic exercises for 10 minutes including:  -gentle AROM as follows: jt blocking, TGEs, thumb opposition, wrist flex/ext with fingers flexed and RD/UD with fingers flexed, finger ext, abd/add 2  x 10 reps    Ashu participated in Therapeutic activities for 30 minutes including:  -towel scrunches x 10 reps  - 6 lb wrist 3 ways 2 x 15 reps for lifting activities  -large pom poms 3 containers with red cp for pinching  -small pom poms 3 containers nesting for manipulating  -green  putty: 2' molding, 30 grasps, 3 logs each thumb and ring/ small finger, 5 flowers for grasping and pinching  -Metal hand gripper 4th rung x 30 reps for grasping  -green digi-extend x 30 reps for releasing objects  -green theraflex bar smiles, frowns and twists 2  x 15 reps  -rebounder with red ball x 30 reps for throwing  -finger ADD with yellow sponge 15 reps for grasping   -golf swings x 15 reps    Functional Limitations: Patient presents with the following functional Limitations:   Self Care / ADL: managing two handed but some difficulty with right hand  Home/Work Activities: working light duty  Leisure: working out    Home Exercises Provided: Postural exercises, passive digit flexion (ind and composite) with active extension with MCPs flexed; Edema control techniques, orthotic fabrication/fit/training, instruction in use, wear and care precautions for orthotic and patient reported good understanding of above 10 reps each, 4-6 x day.  3/11/19: Patient provided with right large isotoner glove to be worn daily/nightly for edema control. Patient purchased vibration massager to be used for sensory re-education and edema management for 5 min 1-2 x daily. Issued AROM exercises today.  3/21/2019: Issued finger ext and abd/add ex today for HEP    Education provided re: tendon, nerve, artery repair protocols, healing time frames  Ashu verbalized good understanding of education provided.   No spiritual or educational barriers to learning provided    ASSESSMENT     Patient is now 14 W 1 days s/p I&D with ulnar artery/laceration repair and 13W 3 D s/p tendon, nerve repair. Advanced strengthening activities. Pt performed all activities without difficulty today.     Treatment Diagnosis/ Problem List RUE Decreased ROM, Decreased  strength, Decreased pinch strength, Decreased functional hand use, Increased pain, Edema, Joint Stiffness, Scar Adhesion potential and Diminished/Impaired Sensation     Short Term Goals: Increase  "ROM 3-5 degrees to increase functional hand use for ADLs/work/leisure activities,- Met 5/6/2019 Increase  strength 3-5 lbs. to improve functional grasp for ADLs/work/leisure activities, - Met 5/6/2019 Increase pinch 1-3 psi's to increase IND wiht button and FM Coordination.,- Met 5/6/2019 Decrease edema .2-.3 mm to increase joint mobility/flexibility for hand use. - Met 5/6/2019     Long Term Goals: 1. Increase  strength 10 lbs to increase functional hand use for ADLs/IADLs. - Met 5/28/2019  2. Pt. Will report [place] with playing sports. - progressing      Pt prognosis is Excellent. Pt will continue to benefit from skilled outpatient hand therapy to address the deficits listed in the problem list, provide pt education and to maximize pt's level of independence in the home and community environment.      PLAN:      Will continue/progress with established Plan of Care towards OT goals as orders are received.         Michelle BUTCHER    "I certify that I was present in the room directing the student in service delivery and guiding them using my skilled judgement. As the co-signing therapist, I have reviewed the student's documentation and am responsible for the treatment, assessment and plan."    Therapist: LEONEL Olvera, T  Occupational Therapist  Certified Hand Therapist        "

## 2019-06-06 ENCOUNTER — CLINICAL SUPPORT (OUTPATIENT)
Dept: REHABILITATION | Facility: HOSPITAL | Age: 39
End: 2019-06-06
Attending: CLINIC/CENTER
Payer: COMMERCIAL

## 2019-06-06 DIAGNOSIS — M25.60 RANGE OF MOTION DEFICIT: Primary | ICD-10-CM

## 2019-06-06 DIAGNOSIS — M79.641 PAIN OF RIGHT HAND: ICD-10-CM

## 2019-06-06 PROCEDURE — 97530 THERAPEUTIC ACTIVITIES: CPT

## 2019-06-06 PROCEDURE — 97110 THERAPEUTIC EXERCISES: CPT

## 2019-06-06 PROCEDURE — 97022 WHIRLPOOL THERAPY: CPT

## 2019-06-06 NOTE — PROGRESS NOTES
"              HAND THERAPY/OCCUPATIONAL Daily Treatment Note     Date:  2019 Clinic Number: [id number]  Patient: Ashu Thomas        : 1980      Age: 38 y.o.     Sex: male                       Hand dominance: right  Referring Physician: Dr. Ferreira  RTD:  2019  Diagnosis: Complete laceration of right ulnar wrist with ulnar nerve and artery laceration and FCU, FDS, and FDP to RF and SF  Precautions:  Splint wear/care; infection control; no functional use of    Date of Injury: 19  Date of Surgery: 19 - right hand I&D with ulnar artery/laceration repair; 19 - right hand tendon/nerve repair  Surgical Procedure: Right hand I&D with ulnar artery/laceration repair on 19; Right hand tendon, nerve repair on 19    Visit #: 3/20 (new referral)  Time In: 11:00 AM  Time Out: 12:00 PM  Total Treatment Time: 40 minutes  Charges: fluido, 1 TE, 2TA,    Occupation:  for [employer]  Work Status: Light duty    SUBJECTIVE     Pt reports that he was compliant with his HEP. Pt reports that he is beginning to feel some tingling in the tips of his ring/small finger.    Functional Pain Scale Rating 0-10: 0/10 most times, 1/10 occasionally  Location: ulnar hand and volar central palm  Description: Aching and Tingling  Activities which increase pain: "If I move the wrong way"  Activities which decrease pain: not needed    OBJECTIVE       Current Treatment: manual therapy/joint mobilization, Therapeutic exercises, Sensory re-education, Desensitization, Scar management, Edema management, Joint protection and Orthotic fitting/training    Sensation: Ulnar Nerve Impaired     [place] Chris Monofilament Test:   6.65 (Deep pressure only) -  small finger  4.56 (Loss of protective sensation)   4.31 (Diminished Protective sensation)   3.61(Diminished light touch) ring finger  2.83 (Normal) - throughout median nerve distribution  *Improvement noted on ulnar side of palm with loss of " protective sensation    Scar Assessment: Hypersensitivity in central portion of scar. Scar well healed.    Edema: Circumferential measurements: as follows: (compared to 5/6/19)   Index Middle Ring Small   Proximal Phalanx 6.8 cm (n/c) 7.0 cm (n/c) 6.8 cm (n/c) 6.1 cm (-.1)     MCP's 22.0 cm (-.2)   Distal Palmar Crease 21.8 cm (-3)   Proximal Wrist Crease  19 cm (+.2)         AROM is as follows:    Range of Motion: Active Ext/Flex 5/23/2019 (in degrees) Changes noted in () since 5/6/2019  (Ext/Flex) Index Middle Ring Small   MCP Jt 0/90(+5) 0/87(+2) 0/76(+2) 0/82 (+9)   PIP Jt 0/98(+6) 0/95(+5) 19/100 (-10/+5) 0/95(-10/+2)   DIP Jt 0/65(+5)) 0/74(n/c) 0/62(+5/+2) 0/70(n/c)   ACOSTA 253 (+16) 256(+7) 210(+19) 247(+21)     Thumb MP ext/flex 0/60  Thumb IP ext/flex 0/72    Elbow Ext:  WNL Flex:  WNL   Wrist Ext:  65(+15) Flex:  50(+10)   Wrist RD:  15 (n/c) UD:  15(+5))   Forearm Pron:  WNL Sup:  WNL      Strength: (in pounds/psi)    Date 5/6/2019 5/6/2019 5/28/2019    Left Right Right   Rung II 91 33 (+22) since 4/15/2019 45   Lateral pinch 28 7 7   Tripod pinch 30 5 5   Tip pinch 20 4 4             Ashu received the following supervised modalities after being cleared for contradictions for 15 minutes:   -Patient received  fluido for 15 minutes to increase blood flow, circulation, pain management and for tissue elasticity prior to therex.     Ashu received manual therapy for 5 minutes including:  RM and STM to right hand/wrist    Ashu received therapeutic exercises for 10 minutes including:  -gentle AROM as follows: jt blocking, TGEs, thumb opposition, wrist flex/ext with fingers flexed and RD/UD with fingers flexed, finger ext, abd/add 2  x 10 reps    Ashu participated in Therapeutic activities for 30 minutes including:  -towel scrunches x 10 reps  - 6 lb wrist 3 ways 2 x 15 reps for lifting activities  -large pom poms 3 containers with red cp for pinching  -small pom poms 3 containers nesting for  manipulating  -green putty: 2' molding, 30 grasps, 3 logs each thumb and ring/ small finger, 5 flowers for grasping and pinching  -Metal hand gripper 4th rung x 30 reps for grasping  -green digi-extend x 30 reps for releasing objects  -green theraflex bar smiles, frowns and twists 2  x 15 reps  -rebounder with red ball x 30 reps for throwing  -finger ADD with yellow sponge 15 reps for grasping   -2 plates on rotation bar x 30 reps      Functional Limitations: Patient presents with the following functional Limitations:   Self Care / ADL: managing two handed but some difficulty with right hand  Home/Work Activities: working light duty  Leisure: working out    Home Exercises Provided: Postural exercises, passive digit flexion (ind and composite) with active extension with MCPs flexed; Edema control techniques, orthotic fabrication/fit/training, instruction in use, wear and care precautions for orthotic and patient reported good understanding of above 10 reps each, 4-6 x day.  3/11/19: Patient provided with right large isotoner glove to be worn daily/nightly for edema control. Patient purchased vibration massager to be used for sensory re-education and edema management for 5 min 1-2 x daily. Issued AROM exercises today.  3/21/2019: Issued finger ext and abd/add ex today for HEP    Education provided re: tendon, nerve, artery repair protocols, healing time frames  Ashu verbalized good understanding of education provided.   No spiritual or educational barriers to learning provided    ASSESSMENT     Patient is now 14 W 4 days s/p I&D with ulnar artery/laceration repair and 14W 0 D s/p tendon, nerve repair. Advanced strengthening activities. Pt performed all activities without difficulty today. Pt reports tingling sensation in ring and small finger.    Treatment Diagnosis/ Problem List RUE Decreased ROM, Decreased  strength, Decreased pinch strength, Decreased functional hand use, Increased pain, Edema, Joint  Stiffness, Scar Adhesion potential and Diminished/Impaired Sensation     Short Term Goals: Increase ROM 3-5 degrees to increase functional hand use for ADLs/work/leisure activities,- Met 5/6/2019 Increase  strength 3-5 lbs. to improve functional grasp for ADLs/work/leisure activities, - Met 5/6/2019 Increase pinch 1-3 psi's to increase IND wiht button and FM Coordination.,- Met 5/6/2019 Decrease edema .2-.3 mm to increase joint mobility/flexibility for hand use. - Met 5/6/2019     Long Term Goals: 1. Increase  strength 10 lbs to increase functional hand use for ADLs/IADLs. - Met 5/28/2019  2. Pt. Will report [place] with playing sports. - progressing      Pt prognosis is Excellent. Pt will continue to benefit from skilled outpatient hand therapy to address the deficits listed in the problem list, provide pt education and to maximize pt's level of independence in the home and community environment.      PLAN:      Will continue/progress with established Plan of Care towards OT goals as orders are received.      Therapist: LEONEL Olvera CHT  Occupational Therapist  Certified Hand Therapist

## 2019-06-10 ENCOUNTER — CLINICAL SUPPORT (OUTPATIENT)
Dept: REHABILITATION | Facility: HOSPITAL | Age: 39
End: 2019-06-10
Attending: CLINIC/CENTER
Payer: COMMERCIAL

## 2019-06-10 DIAGNOSIS — M25.60 RANGE OF MOTION DEFICIT: Primary | ICD-10-CM

## 2019-06-10 PROCEDURE — 97022 WHIRLPOOL THERAPY: CPT

## 2019-06-10 PROCEDURE — 97110 THERAPEUTIC EXERCISES: CPT

## 2019-06-10 PROCEDURE — 97530 THERAPEUTIC ACTIVITIES: CPT

## 2019-06-10 NOTE — PROGRESS NOTES
HAND THERAPY/OCCUPATIONAL Daily Treatment Note     Date:  06/10/2019 Clinic Number: [id number]  Patient: Ashu Thomas        : 1980      Age: 38 y.o.     Sex: male                       Hand dominance: right  Referring Physician: Dr. Ferreira  RTD:  2019  Diagnosis: Complete laceration of right ulnar wrist with ulnar nerve and artery laceration and FCU, FDS, and FDP to RF and SF  Precautions:  Splint wear/care; infection control; no functional use of    Date of Injury: 19  Date of Surgery: 19 - right hand I&D with ulnar artery/laceration repair; 19 - right hand tendon/nerve repair  Surgical Procedure: Right hand I&D with ulnar artery/laceration repair on 19; Right hand tendon, nerve repair on 19    Visit #:  (new referral)  Time In: 11:50 AM  Time Out: 12:50 PM  Total Treatment Time: 40 minutes  Charges: fluido, 1 TE, 2TA,    Occupation:  for [employer]  Work Status: Light duty    SUBJECTIVE     Pt reports that he was compliant with his HEP. Pt reports that he is beginning to feel some tingling in the tips of his ring/small finger.  Pt reports he is experiencing some triggering in his middle finger.   Functional Pain Scale Rating 0-10: 0/10 most times, 1/10 occasionally  Location: ulnar hand and volar central palm  Description: Aching and Tingling  Activities which increase pain: throwing the baseball at times  Activities which decrease pain: not needed    OBJECTIVE       Current Treatment: manual therapy/joint mobilization, Therapeutic exercises, Sensory re-education, Desensitization, Scar management, Edema management, Joint protection and Orthotic fitting/training    Sensation: Ulnar Nerve Impaired     [place] Chris Monofilament Test:   6.65 (Deep pressure only) -  small finger  4.56 (Loss of protective sensation)   4.31 (Diminished Protective sensation) - ulnar side of palm  3.61(Diminished light touch) ring finger  2.83 (Normal) -  throughout median nerve distribution  *Improvement noted on ulnar side of palm with diminished protective sensation    Scar Assessment: Hypersensitivity in central portion of scar. Scar well healed.    Edema: Circumferential measurements: as follows: (compared to 5/28/19)   Index Middle Ring Small   Proximal Phalanx 6.7 cm (-.1)) 6.8 cm (-.2) 6.8 cm (n/c) 6.0 cm (-.1)     MCP's 22.0 cm (n/c)   Distal Palmar Crease 21.8 cm (n/c)   Proximal Wrist Crease  19 cm (-.1)         AROM is as follows:    Range of Motion: Active Ext/Flex 5/23/2019 (in degrees) Changes noted in () since 5/28/2019  (Ext/Flex) Index Middle Ring Small   MCP Jt 0/90(+5) 0/87(+2) 0/80(+4) 0/85 (+3)   PIP Jt 0/100(+2) 0/100(+2) 19/100 (n/c/n/c) 0/95(-10/n/c)   DIP Jt 0/65(n/c) 0/74(n/c) 0/62(n/c) 0/70(n/c)   ACOSTA 253 (+16) 256(+7) 210(+19) 247(+21)     Thumb MP ext/flex 0/62  Thumb IP ext/flex 0/72    Elbow Ext:  WNL Flex:  WNL   Wrist Ext:  65(+15) Flex:  55(+5)   Wrist RD:  15 (n/c) UD:  20(+5))   Forearm Pron:  WNL Sup:  WNL      Strength: (in pounds/psi)    Date 5/6/2019 5/6/2019 5/28/2019 6//10/2019    Left Right Right Right   Rung II 91 33  45 62   Lateral pinch 28 7 7 8   Tripod pinch 30 5 5 5   Tip pinch 20 4 4 4             Ashu received the following supervised modalities after being cleared for contradictions for 15 minutes:   -Patient received  fluido for 15 minutes to increase blood flow, circulation, pain management and for tissue elasticity prior to therex.     Ashu received manual therapy for 5 minutes including:  RM and STM to right hand/wrist    Ashu received therapeutic exercises for 10 minutes including:  -gentle AROM as follows: jt blocking, TGEs, thumb opposition, wrist flex/ext with fingers flexed and RD/UD with fingers flexed, finger ext, abd/add 2  x 10 reps    Ashu participated in Therapeutic activities for 30 minutes including:  -towel scrunches x 10 reps  - 6 lb wrist 3 ways 2 x 15 reps for lifting  activities  -large pom poms 3 containers with red cp for pinching  -small pom poms 3 containers nesting for manipulating  -green putty: 2' molding, 30 grasps (Did not do today due to triggering), 3 logs each thumb and ring/ small finger, 5 flowers for grasping and pinching  -Metal hand gripper 4th rung x 30 reps for grasping (Did not do today due to triggering)  -green digi-extend x 30 reps for releasing objects  -green theraflex bar smiles, frowns and twists 2  x 15 reps  -rebounder with red ball x 30 reps for throwing  -finger ADD with yellow sponge 15 reps for grasping   -2 plates on rotation bar x 30 reps      Functional Limitations: Patient presents with the following functional Limitations:   Self Care / ADL: managing two handed but some difficulty with right hand  Home/Work Activities: working light duty  Leisure: working out    Home Exercises Provided: Postural exercises, passive digit flexion (ind and composite) with active extension with MCPs flexed; Edema control techniques, orthotic fabrication/fit/training, instruction in use, wear and care precautions for orthotic and patient reported good understanding of above 10 reps each, 4-6 x day.  3/11/19: Patient provided with right large isotoner glove to be worn daily/nightly for edema control. Patient purchased vibration massager to be used for sensory re-education and edema management for 5 min 1-2 x daily. Issued AROM exercises today.  3/21/2019: Issued finger ext and abd/add ex today for HEP    Education provided re: tendon, nerve, artery repair protocols, healing time frames  Ashu verbalized good understanding of education provided.   No spiritual or educational barriers to learning provided    ASSESSMENT     Patient is now 15 W 1 day s/p I&D with ulnar artery/laceration repair and 14W 4 D s/p tendon, nerve repair. Continued strengthening activities. Pt performed all activities without difficulty today. Pt reports tingling sensation in ring and small  finger. Pt continues to make AROM gains with his right hand.  strength has improved. 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 Pt is not performing excessive gripping activities at this time due to an occasional triggering in his middle finger.    Treatment Diagnosis/ Problem List RUE Decreased ROM, Decreased  strength, Decreased pinch strength, Decreased functional hand use, Increased pain, Edema, Joint Stiffness, Scar Adhesion potential and Diminished/Impaired Sensation     Short Term Goals: Increase ROM 3-5 degrees to increase functional hand use for ADLs/work/leisure activities,- Met 5/6/2019 Increase  strength 3-5 lbs. to improve functional grasp for ADLs/work/leisure activities, - Met 5/6/2019 Increase pinch 1-3 psi's to increase IND wiht button and FM Coordination.,- Met 5/6/2019 Decrease edema .2-.3 mm to increase joint mobility/flexibility for hand use. - Met 5/6/2019     Long Term Goals: 1. Increase  strength 10 lbs to increase functional hand use for ADLs/IADLs. - Met 5/28/2019  2. Pt. Will report [place] with playing sports. - progressing      Pt prognosis is Excellent. Pt will continue to benefit from skilled outpatient hand therapy to address the deficits listed in the problem list, provide pt education and to maximize pt's level of independence in the home and community environment.      PLAN:  Consult with physician. Recommend d/c over next week to HEP only.    Will continue/progress with established Plan of Care towards OT goals as orders are received.      Therapist: LEONEL Olvera, ARNAV  Occupational Therapist  Certified Hand Therapist

## 2019-06-13 ENCOUNTER — CLINICAL SUPPORT (OUTPATIENT)
Dept: REHABILITATION | Facility: HOSPITAL | Age: 39
End: 2019-06-13
Attending: CLINIC/CENTER
Payer: COMMERCIAL

## 2019-06-13 DIAGNOSIS — M79.641 PAIN OF RIGHT HAND: ICD-10-CM

## 2019-06-13 DIAGNOSIS — M25.60 RANGE OF MOTION DEFICIT: Primary | ICD-10-CM

## 2019-06-13 PROCEDURE — 97530 THERAPEUTIC ACTIVITIES: CPT

## 2019-06-13 PROCEDURE — 97110 THERAPEUTIC EXERCISES: CPT

## 2019-06-13 PROCEDURE — 97022 WHIRLPOOL THERAPY: CPT

## 2019-06-13 NOTE — PROGRESS NOTES
HAND THERAPY/OCCUPATIONAL Daily Treatment Note     Date:  2019 Clinic Number: [id number]  Patient: Ashu Thomas        : 1980      Age: 38 y.o.     Sex: male                       Hand dominance: right  Referring Physician: Dr. Ferreira  RTD:  2019  Diagnosis: Complete laceration of right ulnar wrist with ulnar nerve and artery laceration and FCU, FDS, and FDP to RF and SF  Precautions:  Splint wear/care; infection control; no functional use of    Date of Injury: 19  Date of Surgery: 19 - right hand I&D with ulnar artery/laceration repair; 19 - right hand tendon/nerve repair  Surgical Procedure: Right hand I&D with ulnar artery/laceration repair on 19; Right hand tendon, nerve repair on 19    Visit #:  (new referral)  Time In: 11:00AM  Time Out: 12:00 PM  Total Treatment Time: 40 minutes  Charges: fluido, 1 TE, 2TA,    Occupation:  for [employer]  Work Status: Light duty    SUBJECTIVE     Pt reports that he was compliant with his HEP. Pt reports that he is beginning to feel some tingling in the tips of his ring/small finger.  Pt reports he received an injection from his physician for his trigger finger. He reports decreased triggering.  Functional Pain Scale Rating 0-10: 0/10 most times, 1/10 occasionally  Location: ulnar hand and volar central palm  Description: Aching and Tingling  Activities which increase pain: throwing the baseball at times  Activities which decrease pain: not needed    OBJECTIVE       Current Treatment: manual therapy/joint mobilization, Therapeutic exercises, Sensory re-education, Desensitization, Scar management, Edema management, Joint protection and Orthotic fitting/training    Sensation: Ulnar Nerve Impaired     [place] Chris Monofilament Test:   6.65 (Deep pressure only) -  small finger  4.56 (Loss of protective sensation)   4.31 (Diminished Protective sensation) - ulnar side of palm  3.61(Diminished  light touch) ring finger  2.83 (Normal) - throughout median nerve distribution  *Improvement noted on ulnar side of palm with diminished protective sensation    Scar Assessment: Hypersensitivity in central portion of scar. Scar well healed.    Edema: Circumferential measurements: as follows: (compared to 5/28/19)   Index Middle Ring Small   Proximal Phalanx 6.7 cm (-.1)) 6.8 cm (-.2) 6.8 cm (n/c) 6.0 cm (-.1)     MCP's 22.0 cm (n/c)   Distal Palmar Crease 21.8 cm (n/c)   Proximal Wrist Crease  19 cm (-.1)         AROM is as follows:    Range of Motion: Active Ext/Flex 5/23/2019 (in degrees) Changes noted in () since 5/28/2019  (Ext/Flex) Index Middle Ring Small   MCP Jt 0/90(+5) 0/87(+2) 0/80(+4) 0/85 (+3)   PIP Jt 0/100(+2) 0/100(+2) 19/100 (n/c/n/c) 0/95(-10/n/c)   DIP Jt 0/65(n/c) 0/74(n/c) 0/62(n/c) 0/70(n/c)   ACOSTA 253 (+16) 256(+7) 210(+19) 247(+21)     Thumb MP ext/flex 0/62  Thumb IP ext/flex 0/72    Elbow Ext:  WNL Flex:  WNL   Wrist Ext:  65(+15) Flex:  55(+5)   Wrist RD:  15 (n/c) UD:  20(+5))   Forearm Pron:  WNL Sup:  WNL      Strength: (in pounds/psi)    Date 5/6/2019 5/6/2019 5/28/2019 6//10/2019    Left Right Right Right   Rung II 91 33  45 62   Lateral pinch 28 7 7 8   Tripod pinch 30 5 5 5   Tip pinch 20 4 4 4             Ashu received the following supervised modalities after being cleared for contradictions for 15 minutes:   -Patient received  fluido for 15 minutes to increase blood flow, circulation, pain management and for tissue elasticity prior to therex.     Ashu received manual therapy for 5 minutes including:  RM and STM to right hand/wrist    Ashu received therapeutic exercises for 10 minutes including:  -gentle AROM as follows: jt blocking, TGEs, thumb opposition, wrist flex/ext with fingers flexed and RD/UD with fingers flexed, finger ext, abd/add 2  x 10 reps    Ashu participated in Therapeutic activities for 30 minutes including:    - 6 lb wrist 3 ways 2 x 15 reps for lifting  activities  -large pom poms 3 containers with red cp for pinching  -green putty: 2' molding, 30 grasps (Did not do today due to triggering), 3 logs each thumb and ring/ small finger, 5 flowers for grasping and pinching  -Metal hand gripper 4th rung x 30 reps for grasping (Did not do today due to triggering)  -green digi-extend x 30 reps for releasing objects  -green theraflex bar smiles, frowns and twists 2  x 15 reps  -rebounder with red ball x 30 reps for throwing  -finger ADD with pink sponge 15 reps for grasping   -3 plates on rotation bar x 30 reps  -black digiflex for gripping x 30 reps      Functional Limitations: Patient presents with the following functional Limitations:   Self Care / ADL: managing two handed but some difficulty with right hand  Home/Work Activities: working light duty  Leisure: working out    Home Exercises Provided: Postural exercises, passive digit flexion (ind and composite) with active extension with MCPs flexed; Edema control techniques, orthotic fabrication/fit/training, instruction in use, wear and care precautions for orthotic and patient reported good understanding of above 10 reps each, 4-6 x day.  3/11/19: Patient provided with right large isotoner glove to be worn daily/nightly for edema control. Patient purchased vibration massager to be used for sensory re-education and edema management for 5 min 1-2 x daily. Issued AROM exercises today.  3/21/2019: Issued finger ext and abd/add ex today for HEP    Education provided re: tendon, nerve, artery repair protocols, healing time frames  Ashu verbalized good understanding of education provided.   No spiritual or educational barriers to learning provided    ASSESSMENT     Patient is now 15 W 1 day s/p I&D with ulnar artery/laceration repair and 14W 4 D s/p tendon, nerve repair. Continued strengthening activities. Pt performed all activities without difficulty today. Advanced strengthening exercises without difficulty. Decreased  triggering noted after injection.    Treatment Diagnosis/ Problem List RUE Decreased ROM, Decreased  strength, Decreased pinch strength, Decreased functional hand use, Increased pain, Edema, Joint Stiffness, Scar Adhesion potential and Diminished/Impaired Sensation     Short Term Goals: Increase ROM 3-5 degrees to increase functional hand use for ADLs/work/leisure activities,- Met 5/6/2019 Increase  strength 3-5 lbs. to improve functional grasp for ADLs/work/leisure activities, - Met 5/6/2019 Increase pinch 1-3 psi's to increase IND wiht button and FM Coordination.,- Met 5/6/2019 Decrease edema .2-.3 mm to increase joint mobility/flexibility for hand use. - Met 5/6/2019     Long Term Goals: 1. Increase  strength 10 lbs to increase functional hand use for ADLs/IADLs. - Met 5/28/2019  2. Pt. Will report [place] with playing sports. - progressing      Pt prognosis is Excellent. Pt will continue to benefit from skilled outpatient hand therapy to address the deficits listed in the problem list, provide pt education and to maximize pt's level of independence in the home and community environment.      PLAN:  Prepare for d/c next week.    Will continue/progress with established Plan of Care towards OT goals as orders are received.      Therapist: LEONEL Olvera, PRICILALT  Occupational Therapist  Certified Hand Therapist

## 2019-06-17 ENCOUNTER — CLINICAL SUPPORT (OUTPATIENT)
Dept: REHABILITATION | Facility: HOSPITAL | Age: 39
End: 2019-06-17
Attending: CLINIC/CENTER
Payer: COMMERCIAL

## 2019-06-17 DIAGNOSIS — M25.60 RANGE OF MOTION DEFICIT: Primary | ICD-10-CM

## 2019-06-17 DIAGNOSIS — M79.641 PAIN OF RIGHT HAND: ICD-10-CM

## 2019-06-17 PROCEDURE — 97022 WHIRLPOOL THERAPY: CPT

## 2019-06-17 PROCEDURE — 97530 THERAPEUTIC ACTIVITIES: CPT

## 2019-06-17 PROCEDURE — 97110 THERAPEUTIC EXERCISES: CPT

## 2019-06-17 NOTE — PROGRESS NOTES
HAND THERAPY/OCCUPATIONAL Daily Treatment Note     Date:  2019 Clinic Number: [id number]  Patient: Ashu Thomas        : 1980      Age: 38 y.o.     Sex: male                       Hand dominance: right  Referring Physician: Dr. Ferreira  RTD:  2019  Diagnosis: Complete laceration of right ulnar wrist with ulnar nerve and artery laceration and FCU, FDS, and FDP to RF and SF  Precautions:  Splint wear/care; infection control; no functional use of    Date of Injury: 19  Date of Surgery: 19 - right hand I&D with ulnar artery/laceration repair; 19 - right hand tendon/nerve repair  Surgical Procedure: Right hand I&D with ulnar artery/laceration repair on 19; Right hand tendon, nerve repair on 19    Visit #:  (new referral)  Time In: 10:45 AM  Time Out: 11:45 AM  Total Treatment Time: 40 minutes  Charges: fluido, 1 TE, 2TA,    Occupation:  for [employer]  Work Status: Light duty    SUBJECTIVE     Pt reports that he was compliant with his HEP. Pt reports that he is beginning to feel some tingling in the tips of his ring/small finger.  Pt reports he is no longer triggering.  Functional Pain Scale Rating 0-10: 0/10 most times, 1/10 occasionally  Location: ulnar hand and volar central palm  Description: Aching and Tingling  Activities which increase pain: throwing the baseball at times  Activities which decrease pain: not needed    OBJECTIVE       Current Treatment: manual therapy/joint mobilization, Therapeutic exercises, Sensory re-education, Desensitization, Scar management, Edema management, Joint protection and Orthotic fitting/training    Sensation: Ulnar Nerve Impaired     [place] Chris Monofilament Test:   6.65 (Deep pressure only) -  small finger  4.56 (Loss of protective sensation)   4.31 (Diminished Protective sensation) - ulnar side of palm  3.61(Diminished light touch) ring finger  2.83 (Normal) - throughout median nerve  distribution  *Improvement noted on ulnar side of palm with diminished protective sensation    Scar Assessment: Hypersensitivity in central portion of scar. Scar well healed.    Edema: Circumferential measurements: as follows: (compared to 5/28/19)   Index Middle Ring Small   Proximal Phalanx 6.7 cm (-.1)) 6.8 cm (-.2) 6.8 cm (n/c) 6.0 cm (-.1)     MCP's 22.0 cm (n/c)   Distal Palmar Crease 21.8 cm (n/c)   Proximal Wrist Crease  19 cm (-.1)         AROM is as follows:    Range of Motion: Active Ext/Flex 5/23/2019 (in degrees) Changes noted in () since 5/28/2019  (Ext/Flex) Index Middle Ring Small   MCP Jt 0/90(+5) 0/87(+2) 0/80(+4) 0/85 (+3)   PIP Jt 0/100(+2) 0/100(+2) 19/100 (n/c/n/c) 0/95(-10/n/c)   DIP Jt 0/65(n/c) 0/74(n/c) 0/62(n/c) 0/70(n/c)   ACOSTA 253 (+16) 256(+7) 210(+19) 247(+21)     Thumb MP ext/flex 0/62  Thumb IP ext/flex 0/72    Elbow Ext:  WNL Flex:  WNL   Wrist Ext:  65(+15) Flex:  55(+5)   Wrist RD:  15 (n/c) UD:  20(+5))   Forearm Pron:  WNL Sup:  WNL      Strength: (in pounds/psi)    Date 5/6/2019 5/6/2019 5/28/2019 6//10/2019    Left Right Right Right   Rung II 91 33  45 62   Lateral pinch 28 7 7 8   Tripod pinch 30 5 5 5   Tip pinch 20 4 4 4             Ashu received the following supervised modalities after being cleared for contradictions for 15 minutes:   -Patient received  fluido for 15 minutes to increase blood flow, circulation, pain management and for tissue elasticity prior to therex.     Ashu received manual therapy for 5 minutes including:  RM and STM to right hand/wrist    Ashu received therapeutic exercises for 10 minutes including:  -gentle AROM as follows: jt blocking, TGEs, thumb opposition, wrist flex/ext with fingers flexed and RD/UD with fingers flexed, finger ext, abd/add 2  x 10 reps    Ashu participated in Therapeutic activities for 30 minutes including:    - 10 lb wrist 3 ways 2 x 15 reps for lifting activities  -large pom poms 3 containers with red cp for  pinching  -green putty: 5 flowers for releasing.   -blue putty: 2' molding, 30 grasps (Did not do today due to triggering), 3 logs each thumb and ring/ small finger for grasping and pinching  -Metal hand gripper 4th rung x 30 reps for grasping (Did not do today due to triggering)  -green digi-extend x 30 reps for releasing objects  -green theraflex bar smiles, frowns and twists 2  x 15 reps  -rebounder with red ball x 30 reps for throwing  -finger ADD with pink sponge 15 reps for grasping   -3 plates on rotation bar x 30 reps  -black digiflex for gripping x 30 reps      Functional Limitations: Patient presents with the following functional Limitations:   Self Care / ADL: managing two handed but some difficulty with right hand  Home/Work Activities: working light duty  Leisure: working out    Home Exercises Provided: Postural exercises, passive digit flexion (ind and composite) with active extension with MCPs flexed; Edema control techniques, orthotic fabrication/fit/training, instruction in use, wear and care precautions for orthotic and patient reported good understanding of above 10 reps each, 4-6 x day.  3/11/19: Patient provided with right large isotoner glove to be worn daily/nightly for edema control. Patient purchased vibration massager to be used for sensory re-education and edema management for 5 min 1-2 x daily. Issued AROM exercises today.  3/21/2019: Issued finger ext and abd/add ex today for HEP    Education provided re: tendon, nerve, artery repair protocols, healing time frames  Ashu verbalized good understanding of education provided.   No spiritual or educational barriers to learning provided    ASSESSMENT     Patient is now 15 W 5 days s/p I&D with ulnar artery/laceration repair and 15W 1 D s/p tendon, nerve repair. Advanced strengthening activities. Pt performed all activities without difficulty today. Advanced strengthening exercises without difficulty. Pt reports that he is no longer  triggering.    Treatment Diagnosis/ Problem List RUE Decreased ROM, Decreased  strength, Decreased pinch strength, Decreased functional hand use, Increased pain, Edema, Joint Stiffness, Scar Adhesion potential and Diminished/Impaired Sensation     Short Term Goals: Increase ROM 3-5 degrees to increase functional hand use for ADLs/work/leisure activities,- Met 5/6/2019 Increase  strength 3-5 lbs. to improve functional grasp for ADLs/work/leisure activities, - Met 5/6/2019 Increase pinch 1-3 psi's to increase IND wiht button and FM Coordination.,- Met 5/6/2019 Decrease edema .2-.3 mm to increase joint mobility/flexibility for hand use. - Met 5/6/2019     Long Term Goals: 1. Increase  strength 10 lbs to increase functional hand use for ADLs/IADLs. - Met 5/28/2019  2. Pt. Will report [place] with playing sports. - progressing      Pt prognosis is Excellent. Pt will continue to benefit from skilled outpatient hand therapy to address the deficits listed in the problem list, provide pt education and to maximize pt's level of independence in the home and community environment.      PLAN:  Prepare for d/c next session.    Will continue/progress with established Plan of Care towards OT goals as orders are received.      Therapist: LEONEL Olvera CHT  Occupational Therapist  Certified Hand Therapist

## 2019-06-20 ENCOUNTER — CLINICAL SUPPORT (OUTPATIENT)
Dept: REHABILITATION | Facility: HOSPITAL | Age: 39
End: 2019-06-20
Attending: CLINIC/CENTER
Payer: COMMERCIAL

## 2019-06-20 DIAGNOSIS — M25.60 RANGE OF MOTION DEFICIT: Primary | ICD-10-CM

## 2019-06-20 PROCEDURE — 97022 WHIRLPOOL THERAPY: CPT

## 2019-06-20 PROCEDURE — 97110 THERAPEUTIC EXERCISES: CPT

## 2019-06-20 PROCEDURE — 97530 THERAPEUTIC ACTIVITIES: CPT

## 2019-06-20 NOTE — PROGRESS NOTES
HAND THERAPY/OCCUPATIONAL Discharge Note     Date:  2019 Clinic Number: [id number]  Patient: Ashu Thomas        : 1980      Age: 38 y.o.     Sex: male                       Hand dominance: right  Referring Physician: Dr. Ferreira  RTD:  2019  Diagnosis: Complete laceration of right ulnar wrist with ulnar nerve and artery laceration and FCU, FDS, and FDP to RF and SF  Precautions:  Splint wear/care; infection control; no functional use of    Date of Injury: 19  Date of Surgery: 19 - right hand I&D with ulnar artery/laceration repair; 19 - right hand tendon/nerve repair  Surgical Procedure: Right hand I&D with ulnar artery/laceration repair on 19; Right hand tendon, nerve repair on 19    Visit #:  (new referral)  Time In: 10:15 AM  Time Out: 11:15 AM  Total Treatment Time: 40 minutes  Charges: fluido, 1 TE, 2TA,    Occupation:  for [employer]  Work Status: Light duty    SUBJECTIVE     Pt reports that he was compliant with his HEP.   Pt reports he is no longer triggering.  Functional Pain Scale Rating 0-10: 0/10 most times, 0/10 occasionally  Location: None  Description: None  Activities which increase pain: None  Activities which decrease pain: not needed    OBJECTIVE       Current Treatment: manual therapy/joint mobilization, Therapeutic exercises, Sensory re-education, Desensitization, Scar management, Edema management, Joint protection and Orthotic fitting/training    Sensation: Ulnar Nerve Impaired     [place] Chris Monofilament Test:   6.65 (Deep pressure only) -  small finger  4.56 (Loss of protective sensation)   4.31 (Diminished Protective sensation) - ulnar side of palm  3.61(Diminished light touch) ring finger  2.83 (Normal) - throughout median nerve distribution  *Improvement noted on ulnar side of palm with diminished protective sensation    Scar Assessment: Hypersensitivity in central portion of scar. Scar well  healed.    Edema: Circumferential measurements: as follows: (compared to 5/28/19)   Index Middle Ring Small   Proximal Phalanx 6.7 cm (-.1)) 6.8 cm (-.2) 6.8 cm (n/c) 6.0 cm (-.1)     MCP's 22.0 cm (n/c)   Distal Palmar Crease 21.8 cm (n/c)   Proximal Wrist Crease  19 cm (-.1)         AROM is as follows:    Range of Motion: Active Ext/Flex 6/20//2019 (in degrees) Changes noted in () since 6/10/2019  (Ext/Flex) Index Middle Ring Small   MCP Jt 0/90(n/c) 0/87(n/c) 0/80(n/c) 0/85 (+3)   PIP Jt 0/100(n/c) 0/100 (n/c) 13/100 (+6/n/c) 0/95(n/c/n/c)   DIP Jt 0/65(n/c) 0/74(n/c) 0/62(n/c) 0/70(n/c)   ACOSTA 253 (n/c) 256(n/c) 216(+6) 247(n/c)     Thumb MP ext/flex 0/62  Thumb IP ext/flex 0/72    Elbow Ext:  WNL Flex:  WNL   Wrist Ext:  65(n/c) Flex:  55(n/c)   Wrist RD:  20(+5) UD:  20(n/c)   Forearm Pron:  WNL Sup:  WNL      Strength: (in pounds/psi)    Date 5/6/2019 5/6/2019 5/28/2019 6//10/2019 6/20/2019    Left Right Right Right Right   Rung II 91 33  45 62 64   Lateral pinch 28 7 7 8 9   Tripod pinch 30 5 5 5 6   Tip pinch 20 4 4 4 4         CMS Impairment/Limitation/Restriction for FOTO Hand Survey    Therapist reviewed FOTO scores for Ashu Thomas on 6/20/2019.   FOTO documents entered into EP[employer] - see Media section.    Limitation Score: 34%  Category: Carrying    Current : CJ = at least 20% but < 40% impaired, limited or restricted  Goal: CK = at least 40% but < 60% impaired, limited or restricted               Ashu received the following supervised modalities after being cleared for contradictions for 15 minutes:   -Patient received  fluido for 15 minutes to increase blood flow, circulation, pain management and for tissue elasticity prior to therex.     Ashu received manual therapy for 5 minutes including:  RM and STM to right hand/wrist    Ashu received therapeutic exercises for 10 minutes including:  -gentle AROM as follows: jt blocking, TGEs, thumb opposition, wrist flex/ext with fingers flexed and  RD/UD with fingers flexed, finger ext, abd/add 2  x 10 reps    Ashu participated in Therapeutic activities for 30 minutes including:    - 10 lb wrist 3 ways 2 x 15 reps for lifting activities  -large pom poms 3 containers with red cp for pinching  -green putty: 5 flowers for releasing.   -blue putty: 2' molding, 30 grasps (Did not do today due to triggering), 3 logs each thumb and ring/ small finger for grasping and pinching  -Metal hand gripper 4th rung x 30 reps for grasping (Did not do today due to triggering)  -green digi-extend x 30 reps for releasing objects  -green theraflex bar smiles, frowns and twists 2  x 15 reps  -rebounder with red ball x 30 reps for throwing  -finger ADD with pink sponge 15 reps for grasping   -3 plates on rotation bar x 30 reps  -black digiflex for gripping x 30 reps      Functional Limitations: Patient presents with the following functional Limitations:   Self Care / ADL: managing two handed but some difficulty with right hand  Home/Work Activities: working light duty  Leisure: working out    Home Exercises Provided: Postural exercises, passive digit flexion (ind and composite) with active extension with MCPs flexed; Edema control techniques, orthotic fabrication/fit/training, instruction in use, wear and care precautions for orthotic and patient reported good understanding of above 10 reps each, 4-6 x day.  3/11/19: Patient provided with right large isotoner glove to be worn daily/nightly for edema control. Patient purchased vibration massager to be used for sensory re-education and edema management for 5 min 1-2 x daily. Issued AROM exercises today.  3/21/2019: Issued finger ext and abd/add ex today for HEP    Education provided re: tendon, nerve, artery repair protocols, healing time frames  Ashu verbalized good understanding of education provided.   No spiritual or educational barriers to learning provided    ASSESSMENT     Patient is now 16 W 1 day s/p I&D with ulnar  "artery/laceration repair and 15W 4 D s/p tendon, nerve repair.   Treatment Diagnosis/ Problem List RUE Decreased ROM, Decreased  strength, Decreased pinch strength, Decreased functional hand use, Increased pain, Edema, Joint Stiffness, Scar Adhesion potential and Diminished/Impaired Sensation. Pt has met all of his goals. Pt's Foto score has improved by 62 points form initial eval.  Pt was able to tolerate all of today's activities.      Short Term Goals: Increase ROM 3-5 degrees to increase functional hand use for ADLs/work/leisure activities,- Met 5/6/2019 Increase  strength 3-5 lbs. to improve functional grasp for ADLs/work/leisure activities, - Met 5/6/2019 Increase pinch 1-3 psi's to increase IND wiht button and FM Coordination.,- Met 5/6/2019 Decrease edema .2-.3 mm to increase joint mobility/flexibility for hand use. - Met 5/6/2019     Long Term Goals: 1. Increase  strength 10 lbs to increase functional hand use for ADLs/IADLs. - Met 5/28/2019  2. Pt. Will report [place] with playing sports. - Met 6/20/2019      Pt prognosis is Excellent.      PLAN:  Discharge from .        Michelle Olivera [facility]  "I certify that I was present in the room directing the student in service delivery and guiding them using my skilled judgement. As the co-signing therapist, I have reviewed the student's documentation and am responsible for the treatment, assessment and plan."  Therapist: LEONEL Olvera, ARNAV  Occupational Therapist  Certified Hand Therapist          "

## 2019-08-09 ENCOUNTER — LAB VISIT (OUTPATIENT)
Dept: LAB | Facility: HOSPITAL | Age: 39
End: 2019-08-09
Attending: FAMILY MEDICINE
Payer: COMMERCIAL

## 2019-08-09 ENCOUNTER — OFFICE VISIT (OUTPATIENT)
Dept: FAMILY MEDICINE | Facility: CLINIC | Age: 39
End: 2019-08-09
Payer: COMMERCIAL

## 2019-08-09 VITALS
BODY MASS INDEX: 25.82 KG/M2 | OXYGEN SATURATION: 97 % | WEIGHT: 201.19 LBS | DIASTOLIC BLOOD PRESSURE: 80 MMHG | SYSTOLIC BLOOD PRESSURE: 102 MMHG | HEART RATE: 60 BPM | HEIGHT: 74 IN

## 2019-08-09 DIAGNOSIS — M48.061 SPINAL STENOSIS, LUMBAR REGION, WITHOUT NEUROGENIC CLAUDICATION: Primary | ICD-10-CM

## 2019-08-09 DIAGNOSIS — L70.0 ACNE VULGARIS: ICD-10-CM

## 2019-08-09 DIAGNOSIS — Z00.00 GENERAL MEDICAL EXAM: ICD-10-CM

## 2019-08-09 DIAGNOSIS — M51.37 DEGENERATION OF LUMBAR OR LUMBOSACRAL INTERVERTEBRAL DISC: ICD-10-CM

## 2019-08-09 DIAGNOSIS — N52.9 ERECTILE DYSFUNCTION, UNSPECIFIED ERECTILE DYSFUNCTION TYPE: ICD-10-CM

## 2019-08-09 DIAGNOSIS — S64.01XD: ICD-10-CM

## 2019-08-09 PROBLEM — M79.641 PAIN OF RIGHT HAND: Status: RESOLVED | Noted: 2019-06-03 | Resolved: 2019-08-09

## 2019-08-09 PROBLEM — M25.60 RANGE OF MOTION DEFICIT: Status: RESOLVED | Noted: 2019-03-13 | Resolved: 2019-08-09

## 2019-08-09 PROBLEM — S61.411A LACERATION OF RIGHT HAND WITH COMPLICATION: Status: RESOLVED | Noted: 2019-02-11 | Resolved: 2019-08-09

## 2019-08-09 PROBLEM — S66.822A: Status: RESOLVED | Noted: 2019-02-16 | Resolved: 2019-08-09

## 2019-08-09 PROBLEM — S55.012A: Status: RESOLVED | Noted: 2019-02-12 | Resolved: 2019-08-09

## 2019-08-09 PROBLEM — S66.526A: Status: RESOLVED | Noted: 2019-02-12 | Resolved: 2019-08-09

## 2019-08-09 PROBLEM — S66.921A LACERATION OF RIGHT WRIST WITH TENDON INVOLVEMENT: Status: RESOLVED | Noted: 2019-02-16 | Resolved: 2019-08-09

## 2019-08-09 PROBLEM — S61.419A HAND LACERATION: Status: RESOLVED | Noted: 2019-02-11 | Resolved: 2019-08-09

## 2019-08-09 PROBLEM — S61.511A LACERATION OF RIGHT WRIST WITH TENDON INVOLVEMENT: Status: RESOLVED | Noted: 2019-02-16 | Resolved: 2019-08-09

## 2019-08-09 LAB
ALBUMIN SERPL BCP-MCNC: 4.8 G/DL (ref 3.5–5.2)
ALP SERPL-CCNC: 35 U/L (ref 55–135)
ALT SERPL W/O P-5'-P-CCNC: 15 U/L (ref 10–44)
ANION GAP SERPL CALC-SCNC: 10 MMOL/L (ref 8–16)
AST SERPL-CCNC: 19 U/L (ref 10–40)
BASOPHILS # BLD AUTO: 0.05 K/UL (ref 0–0.2)
BASOPHILS NFR BLD: 1 % (ref 0–1.9)
BILIRUB SERPL-MCNC: 1.6 MG/DL (ref 0.1–1)
BUN SERPL-MCNC: 18 MG/DL (ref 6–20)
CALCIUM SERPL-MCNC: 9.9 MG/DL (ref 8.7–10.5)
CHLORIDE SERPL-SCNC: 103 MMOL/L (ref 95–110)
CHOLEST SERPL-MCNC: 197 MG/DL (ref 120–199)
CHOLEST/HDLC SERPL: 2.7 {RATIO} (ref 2–5)
CO2 SERPL-SCNC: 26 MMOL/L (ref 23–29)
CREAT SERPL-MCNC: 1.2 MG/DL (ref 0.5–1.4)
DIFFERENTIAL METHOD: ABNORMAL
EOSINOPHIL # BLD AUTO: 0.1 K/UL (ref 0–0.5)
EOSINOPHIL NFR BLD: 1.9 % (ref 0–8)
ERYTHROCYTE [DISTWIDTH] IN BLOOD BY AUTOMATED COUNT: 14.7 % (ref 11.5–14.5)
EST. GFR  (AFRICAN AMERICAN): >60 ML/MIN/1.73 M^2
EST. GFR  (NON AFRICAN AMERICAN): >60 ML/MIN/1.73 M^2
ESTIMATED AVG GLUCOSE: 100 MG/DL (ref 68–131)
GLUCOSE SERPL-MCNC: 78 MG/DL (ref 70–110)
HBA1C MFR BLD HPLC: 5.1 % (ref 4–5.6)
HCT VFR BLD AUTO: 49.1 % (ref 40–54)
HDLC SERPL-MCNC: 74 MG/DL (ref 40–75)
HDLC SERPL: 37.6 % (ref 20–50)
HGB BLD-MCNC: 14.8 G/DL (ref 14–18)
IMM GRANULOCYTES # BLD AUTO: 0.01 K/UL (ref 0–0.04)
IMM GRANULOCYTES NFR BLD AUTO: 0.2 % (ref 0–0.5)
LDLC SERPL CALC-MCNC: 114.4 MG/DL (ref 63–159)
LYMPHOCYTES # BLD AUTO: 1.9 K/UL (ref 1–4.8)
LYMPHOCYTES NFR BLD: 36.1 % (ref 18–48)
MCH RBC QN AUTO: 26.1 PG (ref 27–31)
MCHC RBC AUTO-ENTMCNC: 30.1 G/DL (ref 32–36)
MCV RBC AUTO: 87 FL (ref 82–98)
MONOCYTES # BLD AUTO: 0.6 K/UL (ref 0.3–1)
MONOCYTES NFR BLD: 11.3 % (ref 4–15)
NEUTROPHILS # BLD AUTO: 2.6 K/UL (ref 1.8–7.7)
NEUTROPHILS NFR BLD: 49.5 % (ref 38–73)
NONHDLC SERPL-MCNC: 123 MG/DL
NRBC BLD-RTO: 0 /100 WBC
PLATELET # BLD AUTO: 313 K/UL (ref 150–350)
PMV BLD AUTO: 9.3 FL (ref 9.2–12.9)
POTASSIUM SERPL-SCNC: 4.3 MMOL/L (ref 3.5–5.1)
PROT SERPL-MCNC: 7.3 G/DL (ref 6–8.4)
RBC # BLD AUTO: 5.67 M/UL (ref 4.6–6.2)
SODIUM SERPL-SCNC: 139 MMOL/L (ref 136–145)
T3FREE SERPL-MCNC: 2.4 PG/ML (ref 2.3–4.2)
T4 FREE SERPL-MCNC: 1.1 NG/DL (ref 0.71–1.51)
TRIGL SERPL-MCNC: 43 MG/DL (ref 30–150)
TSH SERPL DL<=0.005 MIU/L-ACNC: 2.15 UIU/ML (ref 0.4–4)
WBC # BLD AUTO: 5.21 K/UL (ref 3.9–12.7)

## 2019-08-09 PROCEDURE — 84443 ASSAY THYROID STIM HORMONE: CPT

## 2019-08-09 PROCEDURE — 99999 PR PBB SHADOW E&M-EST. PATIENT-LVL III: CPT | Mod: PBBFAC,,, | Performed by: FAMILY MEDICINE

## 2019-08-09 PROCEDURE — 83036 HEMOGLOBIN GLYCOSYLATED A1C: CPT

## 2019-08-09 PROCEDURE — 3008F BODY MASS INDEX DOCD: CPT | Mod: CPTII,S$GLB,, | Performed by: FAMILY MEDICINE

## 2019-08-09 PROCEDURE — 80061 LIPID PANEL: CPT

## 2019-08-09 PROCEDURE — 3008F PR BODY MASS INDEX (BMI) DOCUMENTED: ICD-10-PCS | Mod: CPTII,S$GLB,, | Performed by: FAMILY MEDICINE

## 2019-08-09 PROCEDURE — 36415 COLL VENOUS BLD VENIPUNCTURE: CPT | Mod: PN

## 2019-08-09 PROCEDURE — 85025 COMPLETE CBC W/AUTO DIFF WBC: CPT

## 2019-08-09 PROCEDURE — 84439 ASSAY OF FREE THYROXINE: CPT

## 2019-08-09 PROCEDURE — 99999 PR PBB SHADOW E&M-EST. PATIENT-LVL III: ICD-10-PCS | Mod: PBBFAC,,, | Performed by: FAMILY MEDICINE

## 2019-08-09 PROCEDURE — 84481 FREE ASSAY (FT-3): CPT

## 2019-08-09 PROCEDURE — 99203 OFFICE O/P NEW LOW 30 MIN: CPT | Mod: S$GLB,,, | Performed by: FAMILY MEDICINE

## 2019-08-09 PROCEDURE — 99203 PR OFFICE/OUTPT VISIT, NEW, LEVL III, 30-44 MIN: ICD-10-PCS | Mod: S$GLB,,, | Performed by: FAMILY MEDICINE

## 2019-08-09 PROCEDURE — 80053 COMPREHEN METABOLIC PANEL: CPT

## 2019-08-09 RX ORDER — CLINDAMYCIN PHOSPHATE 10 MG/ML
SOLUTION TOPICAL 2 TIMES DAILY
Qty: 60 EACH | Refills: 11 | Status: SHIPPED | OUTPATIENT
Start: 2019-08-09 | End: 2020-10-01 | Stop reason: SDUPTHER

## 2019-08-09 NOTE — PROGRESS NOTES
Assessment and Plan:    1. Spinal stenosis, lumbar region, without neurogenic claudication  Pain resolved, anticipate intermittent flares.  Treat as needed    2. Degeneration of lumbar or lumbosacral intervertebral disc  As above    3. General medical exam  - CBC auto differential; Future  - Comprehensive metabolic panel; Future  - Lipid panel; Future  - TSH; Future  - Hemoglobin A1c; Future  - T3, free; Future  - T4, free; Future    4. Injury of right ulnar nerve at wrist, subsequent encounter  Injury mostly resolved.  Still has some mild paresthesia    5. Erectile dysfunction, unspecified erectile dysfunction type  Resolved    6. Acne vulgaris  - clindamycin (CLEOCIN T) 1 % Swab; Apply topically 2 (two) times daily.  Dispense: 60 each; Refill: 11        ______________________________________________________________________  Subjective:    Chief Complaint:  Chief Complaint   Patient presents with    [facility]        HPI:  Ashu is a 38 y.o. year old     Lumbar spine pathology  Workup began in 2012.  Diagnosed with multilevel degenerative disc disease, spondylosis, facet arthropathy.  Has underwent multiple ANNE procedures and physical therapy.  Pain controlled.  No recent flares.  No motor or sensory deficits.    Complete laceration to right ulnar wrist with ulnar nerve and artery laceration   Injury occurred February 2019 via shattered wine glass.  Also had tendons lacerated.  Underwent occupational therapy.  Reports still having some mild paresthesia.  Otherwise majority of function returned.    Erectile dysfunction  Treated with Cialis in the past.  All resolved. Not on medication.  Reports being under lot of stress at the time of initial symptom onset    Health maintenance  Due for flu vaccine      Past Medical History:  Past Medical History:   Diagnosis Date    Acne        Past Surgical History:  Past Surgical History:   Procedure Laterality Date    EXPLORATION, IRRIGATION AND DEBRIDEMENT OF HAND  LACERATIOM Right 2/11/2019    Performed by FELICITAS Ferreira MD at [facility] OR    FCU PRIMARY REPAIR Right 2/16/2019    Performed by [facility]AS Ferreira MD at [facility] OR    FDP TENDON REPAIR, RING AND SMALL FINGER, FDS TENDON REPAIR, RING AND SMALL FINGER Right 2/16/2019    Performed by [facility]AS Ferreira MD at [facility] OR    RELEASE, CARPAL TUNNEL Right 2/16/2019    Performed by [facility]AS Ferreira MD at [facility] OR    REPAIR OF ULNAR ARTERY, TRANSECTED AND HAND LACERATION REPAIR Right 2/11/2019    Performed by [facility]AS Ferreira MD at [facility] OR    REPAIR, NERVE, DEEP MOTOR BRANCH x1, ULNAR SENSORY BRANCH x2 Right 2/16/2019    Performed by [facility]AS Ferreira MD at [facility] OR    SKIN SURGERY  1984    mass behind or in right EOC removed benign    TONSILLECTOMY         Family History:  Family History   Problem Relation Age of Onset    Heart disease Mother 55        CABG    Hyperlipidemia Mother     Hypertension Mother     Hyperlipidemia Father     Heart disease Father 63        stent    Arthritis Father         WILLIAM bilateral    No Known Problems Brother     Heart disease Paternal Uncle     Cancer Maternal Grandmother         Colon Cancer    Diabetes Maternal Grandmother     No Known Problems Daughter     No Known Problems Son        Social History:  Social History     Socioeconomic History    Marital status:      Spouse name: Not on file    Number of children: Not on file    Years of education: Not on file    Highest education level: Not on file   Occupational History    Occupation: [employer]    Social Needs    Financial resource strain: Not on file    Food insecurity:     Worry: Not on file     Inability: Not on file    Transportation needs:     Medical: Not on file     Non-medical: Not on file   Tobacco Use    Smoking status: Never Smoker    Smokeless tobacco: Never Used   Substance and Sexual Activity    Alcohol use: Yes     Alcohol/week: 4.8 oz     Types: 4 Glasses of wine, 4 Cans of  beer per week    Drug use: No    Sexual activity: Yes     Partners: Female   Lifestyle    Physical activity:     Days per week: Not on file     Minutes per session: Not on file    Stress: Not on file   Relationships    Social connections:     Talks on phone: Not on file     Gets together: Not on file     Attends [employer] service: Not on file     Active member of club or organization: Not on file     Attends meetings of clubs or organizations: Not on file     Relationship status: Not on file   Other Topics Concern    Not on file   Social History Narrative    Not on file       Medications:  Current Outpatient Medications on File Prior to Visit   Medication Sig Dispense Refill    [DISCONTINUED] HYDROcodone-acetaminophen (NORCO) 5-325 mg per tablet Take 1 tablet by mouth every 6 (six) hours as needed for Pain. 21 tablet 0    [DISCONTINUED] ibuprofen (ADVIL,MOTRIN) 600 MG tablet Take 600 mg by mouth 3 (three) times daily as needed for Pain.      [DISCONTINUED] multivit-min/iron/folic acid/K (ADULTS MULTIVITAMIN ORAL) Take 1 tablet by mouth once daily.       [DISCONTINUED] ondansetron (ZOFRAN-ODT) 8 MG TbDL Take 1 tablet (8 mg total) by mouth every 12 (twelve) hours as needed. 8 tablet 0     Current Facility-Administered Medications on File Prior to Visit   Medication Dose Route Frequency Provider Last Rate Last Dose    ceFAZolin injection 1 g  1 g Intravenous On Call Lidia Ferreira MD   2 g at 02/16/19 0937    lactated ringers infusion   Intravenous Continuous Inderjit Ospina  mL/hr at 02/16/19 0847         Allergies:  Patient has no known allergies.    Immunizations:  Immunization History   Administered Date(s) Administered    Influenza Split 10/02/2012    Tdap 11/08/2010       Review of Systems:  Review of Systems   Constitutional: Negative for fever.   Respiratory: Negative for cough and shortness of breath.    Cardiovascular: Negative for chest pain.   Gastrointestinal:  "Negative for abdominal pain, diarrhea, nausea and vomiting.   Skin: Negative for rash.   Psychiatric/Behavioral: Negative for dysphoric mood.       Objective:    Vitals:  Vitals:    08/09/19 0801   BP: 102/80   Pulse: 60   SpO2: 97%   Weight: 91.3 kg (201 lb 2.7 oz)   Height: 6' 2" (1.88 m)   PainSc: 0-No pain       Physical Exam   Constitutional: No distress.   HENT:   Head: Normocephalic and atraumatic.   Eyes: Pupils are equal, round, and reactive to light. EOM are normal.   Neck: Neck supple.   Cardiovascular: Normal rate and regular rhythm. Exam reveals no friction rub.   No murmur heard.  Pulmonary/Chest: Effort normal and breath sounds normal.   Abdominal: Soft. Bowel sounds are normal. He exhibits no distension. There is no tenderness.   Skin: Skin is warm and dry. No rash noted.   Psychiatric: He has a normal mood and affect. His behavior is normal.       Data:  Previous Records reviewed and pertinent for Back and wrist pathology.        Negro Garcia MD  Family Medicine    "

## 2019-08-12 DIAGNOSIS — E80.6 HYPERBILIRUBINEMIA: Primary | ICD-10-CM

## 2020-10-01 DIAGNOSIS — L70.0 ACNE VULGARIS: ICD-10-CM

## 2020-10-01 RX ORDER — CLINDAMYCIN PHOSPHATE 10 MG/ML
SOLUTION TOPICAL 2 TIMES DAILY
Qty: 60 EACH | Refills: 11 | Status: SHIPPED | OUTPATIENT
Start: 2020-10-01 | End: 2021-10-19

## 2020-10-05 ENCOUNTER — PATIENT MESSAGE (OUTPATIENT)
Dept: ADMINISTRATIVE | Facility: HOSPITAL | Age: 40
End: 2020-10-05

## 2021-01-04 ENCOUNTER — PATIENT MESSAGE (OUTPATIENT)
Dept: ADMINISTRATIVE | Facility: HOSPITAL | Age: 41
End: 2021-01-04

## 2021-02-18 ENCOUNTER — OFFICE VISIT (OUTPATIENT)
Dept: PODIATRY | Facility: CLINIC | Age: 41
End: 2021-02-18
Payer: COMMERCIAL

## 2021-02-18 ENCOUNTER — HOSPITAL ENCOUNTER (OUTPATIENT)
Dept: RADIOLOGY | Facility: HOSPITAL | Age: 41
Discharge: HOME | End: 2021-02-18
Attending: PODIATRIST
Payer: COMMERCIAL

## 2021-02-18 DIAGNOSIS — M21.621 TAILOR'S BUNION OF RIGHT FOOT: ICD-10-CM

## 2021-02-18 DIAGNOSIS — B35.1 ONYCHOMYCOSIS DUE TO DERMATOPHYTE: Primary | ICD-10-CM

## 2021-02-18 DIAGNOSIS — M67.40 MUCOID CYST OF JOINT: ICD-10-CM

## 2021-02-18 DIAGNOSIS — M20.11 HAV (HALLUX ABDUCTO VALGUS), RIGHT: ICD-10-CM

## 2021-02-18 PROCEDURE — 1125F AMNT PAIN NOTED PAIN PRSNT: CPT | Mod: S$GLB,,, | Performed by: PODIATRIST

## 2021-02-18 PROCEDURE — 99999 PR PBB SHADOW E&M-EST. PATIENT-LVL III: CPT | Mod: PBBFAC,,, | Performed by: PODIATRIST

## 2021-02-18 PROCEDURE — 73630 XR FOOT COMPLETE 3 VIEW RIGHT: ICD-10-PCS | Mod: 26,RT,, | Performed by: RADIOLOGY

## 2021-02-18 PROCEDURE — 99999 PR PBB SHADOW E&M-EST. PATIENT-LVL III: ICD-10-PCS | Mod: PBBFAC,,, | Performed by: PODIATRIST

## 2021-02-18 PROCEDURE — 87101 SKIN FUNGI CULTURE: CPT

## 2021-02-18 PROCEDURE — 99203 PR OFFICE/OUTPT VISIT, NEW, LEVL III, 30-44 MIN: ICD-10-PCS | Mod: S$GLB,,, | Performed by: PODIATRIST

## 2021-02-18 PROCEDURE — 73630 X-RAY EXAM OF FOOT: CPT | Mod: 26,RT,, | Performed by: RADIOLOGY

## 2021-02-18 PROCEDURE — 1125F PR PAIN SEVERITY QUANTIFIED, PAIN PRESENT: ICD-10-PCS | Mod: S$GLB,,, | Performed by: PODIATRIST

## 2021-02-18 PROCEDURE — 99203 OFFICE O/P NEW LOW 30 MIN: CPT | Mod: S$GLB,,, | Performed by: PODIATRIST

## 2021-02-18 PROCEDURE — 73630 X-RAY EXAM OF FOOT: CPT | Mod: TC,PO,RT

## 2021-03-23 LAB — FUNGUS BLD CULT: NORMAL

## 2021-04-02 ENCOUNTER — IMMUNIZATION (OUTPATIENT)
Dept: PRIMARY CARE CLINIC | Facility: CLINIC | Age: 41
End: 2021-04-02
Payer: COMMERCIAL

## 2021-04-02 DIAGNOSIS — Z23 NEED FOR VACCINATION: Primary | ICD-10-CM

## 2021-04-02 PROCEDURE — 91300 PR SARS-COV- 2 COVID-19 VACCINE, NO PRSV, 30MCG/0.3ML, IM: ICD-10-PCS | Mod: S$GLB,,, | Performed by: INTERNAL MEDICINE

## 2021-04-02 PROCEDURE — 91300 PR SARS-COV- 2 COVID-19 VACCINE, NO PRSV, 30MCG/0.3ML, IM: CPT | Mod: S$GLB,,, | Performed by: INTERNAL MEDICINE

## 2021-04-02 PROCEDURE — 0001A PR IMMUNIZ ADMIN, SARS-COV-2 COVID-19 VACC, 30MCG/0.3ML, 1ST DOSE: ICD-10-PCS | Mod: CV19,S$GLB,, | Performed by: INTERNAL MEDICINE

## 2021-04-02 PROCEDURE — 0001A PR IMMUNIZ ADMIN, SARS-COV-2 COVID-19 VACC, 30MCG/0.3ML, 1ST DOSE: CPT | Mod: CV19,S$GLB,, | Performed by: INTERNAL MEDICINE

## 2021-04-02 RX ADMIN — Medication 0.3 ML: at 07:04

## 2021-04-06 ENCOUNTER — PATIENT MESSAGE (OUTPATIENT)
Dept: ADMINISTRATIVE | Facility: HOSPITAL | Age: 41
End: 2021-04-06

## 2021-04-23 ENCOUNTER — IMMUNIZATION (OUTPATIENT)
Dept: PRIMARY CARE CLINIC | Facility: CLINIC | Age: 41
End: 2021-04-23
Payer: COMMERCIAL

## 2021-04-23 DIAGNOSIS — Z23 NEED FOR VACCINATION: Primary | ICD-10-CM

## 2021-04-23 PROCEDURE — 0002A PR IMMUNIZ ADMIN, SARS-COV-2 COVID-19 VACC, 30MCG/0.3ML, 2ND DOSE: CPT | Mod: CV19,S$GLB,, | Performed by: INTERNAL MEDICINE

## 2021-04-23 PROCEDURE — 91300 PR SARS-COV- 2 COVID-19 VACCINE, NO PRSV, 30MCG/0.3ML, IM: CPT | Mod: S$GLB,,, | Performed by: INTERNAL MEDICINE

## 2021-04-23 PROCEDURE — 91300 PR SARS-COV- 2 COVID-19 VACCINE, NO PRSV, 30MCG/0.3ML, IM: ICD-10-PCS | Mod: S$GLB,,, | Performed by: INTERNAL MEDICINE

## 2021-04-23 PROCEDURE — 0002A PR IMMUNIZ ADMIN, SARS-COV-2 COVID-19 VACC, 30MCG/0.3ML, 2ND DOSE: ICD-10-PCS | Mod: CV19,S$GLB,, | Performed by: INTERNAL MEDICINE

## 2021-04-23 RX ADMIN — Medication 0.3 ML: at 08:04

## 2021-06-08 ENCOUNTER — TELEPHONE (OUTPATIENT)
Dept: PODIATRY | Facility: CLINIC | Age: 41
End: 2021-06-08

## 2021-06-08 ENCOUNTER — NURSE TRIAGE (OUTPATIENT)
Dept: ADMINISTRATIVE | Facility: CLINIC | Age: 41
End: 2021-06-08

## 2021-06-09 ENCOUNTER — OFFICE VISIT (OUTPATIENT)
Dept: PODIATRY | Facility: CLINIC | Age: 41
End: 2021-06-09
Payer: COMMERCIAL

## 2021-06-09 VITALS — HEART RATE: 58 BPM | DIASTOLIC BLOOD PRESSURE: 70 MMHG | SYSTOLIC BLOOD PRESSURE: 109 MMHG

## 2021-06-09 DIAGNOSIS — M10.9 ACUTE GOUT INVOLVING TOE OF LEFT FOOT, UNSPECIFIED CAUSE: Primary | ICD-10-CM

## 2021-06-09 PROCEDURE — 1125F AMNT PAIN NOTED PAIN PRSNT: CPT | Mod: S$GLB,,, | Performed by: PODIATRIST

## 2021-06-09 PROCEDURE — 99214 OFFICE O/P EST MOD 30 MIN: CPT | Mod: S$GLB,,, | Performed by: PODIATRIST

## 2021-06-09 PROCEDURE — 99999 PR PBB SHADOW E&M-EST. PATIENT-LVL III: CPT | Mod: PBBFAC,,, | Performed by: PODIATRIST

## 2021-06-09 PROCEDURE — 99999 PR PBB SHADOW E&M-EST. PATIENT-LVL III: ICD-10-PCS | Mod: PBBFAC,,, | Performed by: PODIATRIST

## 2021-06-09 PROCEDURE — 99214 PR OFFICE/OUTPT VISIT, EST, LEVL IV, 30-39 MIN: ICD-10-PCS | Mod: S$GLB,,, | Performed by: PODIATRIST

## 2021-06-09 PROCEDURE — 1125F PR PAIN SEVERITY QUANTIFIED, PAIN PRESENT: ICD-10-PCS | Mod: S$GLB,,, | Performed by: PODIATRIST

## 2021-06-09 RX ORDER — COLCHICINE 0.6 MG/1
TABLET ORAL
Qty: 30 TABLET | Refills: 0 | Status: SHIPPED | OUTPATIENT
Start: 2021-06-09 | End: 2021-06-30

## 2021-07-06 ENCOUNTER — OFFICE VISIT (OUTPATIENT)
Dept: PHYSICAL MEDICINE AND REHAB | Facility: CLINIC | Age: 41
End: 2021-07-06
Payer: COMMERCIAL

## 2021-07-06 ENCOUNTER — HOSPITAL ENCOUNTER (OUTPATIENT)
Dept: RADIOLOGY | Facility: HOSPITAL | Age: 41
Discharge: HOME | End: 2021-07-06
Attending: PHYSICAL MEDICINE & REHABILITATION
Payer: COMMERCIAL

## 2021-07-06 VITALS — HEIGHT: 74 IN | BODY MASS INDEX: 25.8 KG/M2 | WEIGHT: 201 LBS

## 2021-07-06 DIAGNOSIS — M25.852 FEMOROACETABULAR IMPINGEMENT OF LEFT HIP: ICD-10-CM

## 2021-07-06 DIAGNOSIS — M25.852 FEMOROACETABULAR IMPINGEMENT OF LEFT HIP: Primary | ICD-10-CM

## 2021-07-06 DIAGNOSIS — Z74.09 IMPAIRED FUNCTIONAL MOBILITY AND ACTIVITY TOLERANCE: ICD-10-CM

## 2021-07-06 DIAGNOSIS — M70.72 ILIOPSOAS BURSITIS OF LEFT HIP: ICD-10-CM

## 2021-07-06 DIAGNOSIS — M25.552 LEFT HIP PAIN: ICD-10-CM

## 2021-07-06 PROCEDURE — 99204 PR OFFICE/OUTPT VISIT, NEW, LEVL IV, 45-59 MIN: ICD-10-PCS | Mod: S$GLB,,, | Performed by: PHYSICAL MEDICINE & REHABILITATION

## 2021-07-06 PROCEDURE — 99204 OFFICE O/P NEW MOD 45 MIN: CPT | Mod: S$GLB,,, | Performed by: PHYSICAL MEDICINE & REHABILITATION

## 2021-07-06 PROCEDURE — 3008F PR BODY MASS INDEX (BMI) DOCUMENTED: ICD-10-PCS | Mod: CPTII,S$GLB,, | Performed by: PHYSICAL MEDICINE & REHABILITATION

## 2021-07-06 PROCEDURE — 73522 X-RAY EXAM HIPS BI 3-4 VIEWS: CPT | Mod: TC,FY

## 2021-07-06 PROCEDURE — 73522 X-RAY EXAM HIPS BI 3-4 VIEWS: CPT | Mod: 26,,, | Performed by: RADIOLOGY

## 2021-07-06 PROCEDURE — 73522 XR HIP 3 OR 4 VIEWS BILATERAL: ICD-10-PCS | Mod: 26,,, | Performed by: RADIOLOGY

## 2021-07-06 PROCEDURE — 3008F BODY MASS INDEX DOCD: CPT | Mod: CPTII,S$GLB,, | Performed by: PHYSICAL MEDICINE & REHABILITATION

## 2021-07-06 PROCEDURE — 99999 PR PBB SHADOW E&M-EST. PATIENT-LVL III: ICD-10-PCS | Mod: PBBFAC,,, | Performed by: PHYSICAL MEDICINE & REHABILITATION

## 2021-07-06 PROCEDURE — 99999 PR PBB SHADOW E&M-EST. PATIENT-LVL III: CPT | Mod: PBBFAC,,, | Performed by: PHYSICAL MEDICINE & REHABILITATION

## 2021-07-07 ENCOUNTER — PATIENT MESSAGE (OUTPATIENT)
Dept: ADMINISTRATIVE | Facility: HOSPITAL | Age: 41
End: 2021-07-07

## 2021-07-07 ENCOUNTER — PATIENT MESSAGE (OUTPATIENT)
Dept: PHYSICAL MEDICINE AND REHAB | Facility: CLINIC | Age: 41
End: 2021-07-07

## 2021-07-14 ENCOUNTER — HOSPITAL ENCOUNTER (OUTPATIENT)
Dept: RADIOLOGY | Facility: HOSPITAL | Age: 41
Discharge: HOME | End: 2021-07-14
Attending: PHYSICAL MEDICINE & REHABILITATION
Payer: COMMERCIAL

## 2021-07-14 DIAGNOSIS — M25.852 FEMOROACETABULAR IMPINGEMENT OF LEFT HIP: ICD-10-CM

## 2021-07-14 DIAGNOSIS — M70.72 ILIOPSOAS BURSITIS OF LEFT HIP: ICD-10-CM

## 2021-07-14 PROCEDURE — 27093 XR ARTHROGRAM HIP LEFT WITH MRI TO FOLLOW: ICD-10-PCS | Mod: ,,, | Performed by: RADIOLOGY

## 2021-07-14 PROCEDURE — 25500020 PHARM REV CODE 255: Performed by: PHYSICAL MEDICINE & REHABILITATION

## 2021-07-14 PROCEDURE — 73525 XR ARTHROGRAM HIP LEFT WITH MRI TO FOLLOW: ICD-10-PCS | Mod: 26,LT,, | Performed by: RADIOLOGY

## 2021-07-14 PROCEDURE — 27093 INJECTION FOR HIP X-RAY: CPT

## 2021-07-14 PROCEDURE — 73525 CONTRAST X-RAY OF HIP: CPT | Mod: TC,LT

## 2021-07-14 PROCEDURE — 27093 INJECTION FOR HIP X-RAY: CPT | Mod: ,,, | Performed by: RADIOLOGY

## 2021-07-14 PROCEDURE — 73722 MRI JOINT OF LWR EXTR W/DYE: CPT | Mod: TC,LT

## 2021-07-14 PROCEDURE — 73525 CONTRAST X-RAY OF HIP: CPT | Mod: 26,LT,, | Performed by: RADIOLOGY

## 2021-07-14 PROCEDURE — 73722 MRI JOINT OF LWR EXTR W/DYE: CPT | Mod: 26,LT,, | Performed by: RADIOLOGY

## 2021-07-14 PROCEDURE — 73722 MRI ARTHROGRAM HIP LEFT: ICD-10-PCS | Mod: 26,LT,, | Performed by: RADIOLOGY

## 2021-07-14 PROCEDURE — A9577 INJ MULTIHANCE: HCPCS | Performed by: PHYSICAL MEDICINE & REHABILITATION

## 2021-07-14 RX ADMIN — GADOBENATE DIMEGLUMINE 0.15 ML: 529 INJECTION, SOLUTION INTRAVENOUS at 01:07

## 2021-07-14 RX ADMIN — IOHEXOL 10 ML: 350 INJECTION, SOLUTION INTRAVENOUS at 01:07

## 2021-07-19 ENCOUNTER — PATIENT MESSAGE (OUTPATIENT)
Dept: PHYSICAL MEDICINE AND REHAB | Facility: CLINIC | Age: 41
End: 2021-07-19

## 2021-08-05 ENCOUNTER — OFFICE VISIT (OUTPATIENT)
Dept: PHYSICAL MEDICINE AND REHAB | Facility: CLINIC | Age: 41
End: 2021-08-05
Payer: COMMERCIAL

## 2021-08-05 VITALS — BODY MASS INDEX: 25.8 KG/M2 | HEIGHT: 74 IN | WEIGHT: 201 LBS | RESPIRATION RATE: 18 BRPM

## 2021-08-05 DIAGNOSIS — M16.12 OSTEOARTHRITIS OF LEFT HIP, UNSPECIFIED OSTEOARTHRITIS TYPE: Primary | ICD-10-CM

## 2021-08-05 DIAGNOSIS — M70.72 ILIOPSOAS BURSITIS OF LEFT HIP: ICD-10-CM

## 2021-08-05 DIAGNOSIS — M77.12 LEFT LATERAL EPICONDYLITIS: ICD-10-CM

## 2021-08-05 DIAGNOSIS — M67.452 GANGLION CYST OF LEFT GROIN: ICD-10-CM

## 2021-08-05 DIAGNOSIS — M25.522 LEFT ELBOW PAIN: ICD-10-CM

## 2021-08-05 PROCEDURE — 99213 PR OFFICE/OUTPT VISIT, EST, LEVL III, 20-29 MIN: ICD-10-PCS | Mod: 25,S$GLB,, | Performed by: PHYSICAL MEDICINE & REHABILITATION

## 2021-08-05 PROCEDURE — 1125F PR PAIN SEVERITY QUANTIFIED, PAIN PRESENT: ICD-10-PCS | Mod: CPTII,S$GLB,, | Performed by: PHYSICAL MEDICINE & REHABILITATION

## 2021-08-05 PROCEDURE — 3008F BODY MASS INDEX DOCD: CPT | Mod: CPTII,S$GLB,, | Performed by: PHYSICAL MEDICINE & REHABILITATION

## 2021-08-05 PROCEDURE — 1125F AMNT PAIN NOTED PAIN PRSNT: CPT | Mod: CPTII,S$GLB,, | Performed by: PHYSICAL MEDICINE & REHABILITATION

## 2021-08-05 PROCEDURE — 1159F PR MEDICATION LIST DOCUMENTED IN MEDICAL RECORD: ICD-10-PCS | Mod: CPTII,S$GLB,, | Performed by: PHYSICAL MEDICINE & REHABILITATION

## 2021-08-05 PROCEDURE — 20611 PR DRAIN/ASP/INJECT MAJOR JOINT/BURSA W/US GUIDANCE: ICD-10-PCS | Mod: LT,S$GLB,, | Performed by: PHYSICAL MEDICINE & REHABILITATION

## 2021-08-05 PROCEDURE — 20611 DRAIN/INJ JOINT/BURSA W/US: CPT | Mod: LT,S$GLB,, | Performed by: PHYSICAL MEDICINE & REHABILITATION

## 2021-08-05 PROCEDURE — 1159F MED LIST DOCD IN RCRD: CPT | Mod: CPTII,S$GLB,, | Performed by: PHYSICAL MEDICINE & REHABILITATION

## 2021-08-05 PROCEDURE — 99213 OFFICE O/P EST LOW 20 MIN: CPT | Mod: 25,S$GLB,, | Performed by: PHYSICAL MEDICINE & REHABILITATION

## 2021-08-05 PROCEDURE — 99999 PR PBB SHADOW E&M-EST. PATIENT-LVL III: CPT | Mod: PBBFAC,,, | Performed by: PHYSICAL MEDICINE & REHABILITATION

## 2021-08-05 PROCEDURE — 3008F PR BODY MASS INDEX (BMI) DOCUMENTED: ICD-10-PCS | Mod: CPTII,S$GLB,, | Performed by: PHYSICAL MEDICINE & REHABILITATION

## 2021-08-05 PROCEDURE — 99999 PR PBB SHADOW E&M-EST. PATIENT-LVL III: ICD-10-PCS | Mod: PBBFAC,,, | Performed by: PHYSICAL MEDICINE & REHABILITATION

## 2021-08-08 ENCOUNTER — PATIENT MESSAGE (OUTPATIENT)
Dept: PHYSICAL MEDICINE AND REHAB | Facility: CLINIC | Age: 41
End: 2021-08-08

## 2021-08-10 ENCOUNTER — PATIENT MESSAGE (OUTPATIENT)
Dept: PHYSICAL MEDICINE AND REHAB | Facility: CLINIC | Age: 41
End: 2021-08-10

## 2021-08-10 DIAGNOSIS — M77.12 LEFT LATERAL EPICONDYLITIS: Primary | ICD-10-CM

## 2021-08-10 DIAGNOSIS — Z01.818 PRE-OP TESTING: ICD-10-CM

## 2021-09-09 ENCOUNTER — PATIENT MESSAGE (OUTPATIENT)
Dept: SURGERY | Facility: AMBULARY SURGERY CENTER | Age: 41
End: 2021-09-09

## 2021-09-09 ENCOUNTER — TELEPHONE (OUTPATIENT)
Dept: PHYSICAL MEDICINE AND REHAB | Facility: CLINIC | Age: 41
End: 2021-09-09

## 2021-09-13 ENCOUNTER — LAB VISIT (OUTPATIENT)
Dept: FAMILY MEDICINE | Facility: CLINIC | Age: 41
End: 2021-09-13
Payer: COMMERCIAL

## 2021-09-13 DIAGNOSIS — Z01.818 PRE-OP TESTING: ICD-10-CM

## 2021-09-13 PROCEDURE — U0005 INFEC AGEN DETEC AMPLI PROBE: HCPCS | Performed by: PHYSICAL MEDICINE & REHABILITATION

## 2021-09-13 PROCEDURE — U0003 INFECTIOUS AGENT DETECTION BY NUCLEIC ACID (DNA OR RNA); SEVERE ACUTE RESPIRATORY SYNDROME CORONAVIRUS 2 (SARS-COV-2) (CORONAVIRUS DISEASE [COVID-19]), AMPLIFIED PROBE TECHNIQUE, MAKING USE OF HIGH THROUGHPUT TECHNOLOGIES AS DESCRIBED BY CMS-2020-01-R: HCPCS | Performed by: PHYSICAL MEDICINE & REHABILITATION

## 2021-09-14 LAB
SARS-COV-2 RNA RESP QL NAA+PROBE: NOT DETECTED
SARS-COV-2- CYCLE NUMBER: NORMAL

## 2021-09-16 ENCOUNTER — HOSPITAL ENCOUNTER (OUTPATIENT)
Facility: AMBULARY SURGERY CENTER | Age: 41
Discharge: HOME | End: 2021-09-16
Attending: PHYSICAL MEDICINE & REHABILITATION | Admitting: PHYSICAL MEDICINE & REHABILITATION
Payer: COMMERCIAL

## 2021-09-16 DIAGNOSIS — M77.10 LATERAL EPICONDYLITIS, UNSPECIFIED LATERALITY: Primary | ICD-10-CM

## 2021-09-16 DIAGNOSIS — M77.10 LATERAL EPICONDYLITIS: ICD-10-CM

## 2021-09-16 PROCEDURE — 24357 REPAIR ELBOW PERC: CPT | Performed by: PHYSICAL MEDICINE & REHABILITATION

## 2021-09-16 PROCEDURE — 24357 PR TENOTOMY ELBOW LATERAL/MEDIAL PERCUTANEOUS: ICD-10-PCS | Mod: LT,,, | Performed by: PHYSICAL MEDICINE & REHABILITATION

## 2021-09-16 PROCEDURE — 24357 REPAIR ELBOW PERC: CPT | Mod: LT,,, | Performed by: PHYSICAL MEDICINE & REHABILITATION

## 2021-09-16 RX ORDER — LIDOCAINE HYDROCHLORIDE 10 MG/ML
1 INJECTION, SOLUTION EPIDURAL; INFILTRATION; INTRACAUDAL; PERINEURAL ONCE
Status: DISCONTINUED | OUTPATIENT
Start: 2021-09-16 | End: 2021-09-16 | Stop reason: HOSPADM

## 2021-09-16 RX ORDER — SODIUM CHLORIDE, SODIUM LACTATE, POTASSIUM CHLORIDE, CALCIUM CHLORIDE 600; 310; 30; 20 MG/100ML; MG/100ML; MG/100ML; MG/100ML
INJECTION, SOLUTION INTRAVENOUS CONTINUOUS
Status: DISCONTINUED | OUTPATIENT
Start: 2021-09-16 | End: 2021-09-16 | Stop reason: HOSPADM

## 2021-09-16 RX ORDER — LIDOCAINE HYDROCHLORIDE 10 MG/ML
INJECTION, SOLUTION EPIDURAL; INFILTRATION; INTRACAUDAL; PERINEURAL
Status: DISCONTINUED | OUTPATIENT
Start: 2021-09-16 | End: 2021-09-16 | Stop reason: HOSPADM

## 2021-09-17 VITALS
SYSTOLIC BLOOD PRESSURE: 129 MMHG | DIASTOLIC BLOOD PRESSURE: 77 MMHG | WEIGHT: 201 LBS | OXYGEN SATURATION: 100 % | HEART RATE: 60 BPM | RESPIRATION RATE: 20 BRPM | BODY MASS INDEX: 25.8 KG/M2 | HEIGHT: 74 IN | TEMPERATURE: 99 F

## 2021-10-07 ENCOUNTER — OFFICE VISIT (OUTPATIENT)
Dept: PHYSICAL MEDICINE AND REHAB | Facility: CLINIC | Age: 41
End: 2021-10-07
Payer: COMMERCIAL

## 2021-10-07 VITALS — WEIGHT: 201 LBS | HEIGHT: 74 IN | RESPIRATION RATE: 18 BRPM | BODY MASS INDEX: 25.8 KG/M2

## 2021-10-07 DIAGNOSIS — M77.12 LEFT LATERAL EPICONDYLITIS: Primary | ICD-10-CM

## 2021-10-07 DIAGNOSIS — M25.522 LEFT ELBOW PAIN: ICD-10-CM

## 2021-10-07 PROCEDURE — 99213 PR OFFICE/OUTPT VISIT, EST, LEVL III, 20-29 MIN: ICD-10-PCS | Mod: S$GLB,,, | Performed by: PHYSICAL MEDICINE & REHABILITATION

## 2021-10-07 PROCEDURE — 1159F PR MEDICATION LIST DOCUMENTED IN MEDICAL RECORD: ICD-10-PCS | Mod: CPTII,S$GLB,, | Performed by: PHYSICAL MEDICINE & REHABILITATION

## 2021-10-07 PROCEDURE — 99213 OFFICE O/P EST LOW 20 MIN: CPT | Mod: S$GLB,,, | Performed by: PHYSICAL MEDICINE & REHABILITATION

## 2021-10-07 PROCEDURE — 99999 PR PBB SHADOW E&M-EST. PATIENT-LVL III: CPT | Mod: PBBFAC,,, | Performed by: PHYSICAL MEDICINE & REHABILITATION

## 2021-10-07 PROCEDURE — 3008F BODY MASS INDEX DOCD: CPT | Mod: CPTII,S$GLB,, | Performed by: PHYSICAL MEDICINE & REHABILITATION

## 2021-10-07 PROCEDURE — 99999 PR PBB SHADOW E&M-EST. PATIENT-LVL III: ICD-10-PCS | Mod: PBBFAC,,, | Performed by: PHYSICAL MEDICINE & REHABILITATION

## 2021-10-07 PROCEDURE — 3008F PR BODY MASS INDEX (BMI) DOCUMENTED: ICD-10-PCS | Mod: CPTII,S$GLB,, | Performed by: PHYSICAL MEDICINE & REHABILITATION

## 2021-10-07 PROCEDURE — 1159F MED LIST DOCD IN RCRD: CPT | Mod: CPTII,S$GLB,, | Performed by: PHYSICAL MEDICINE & REHABILITATION

## 2021-10-09 ENCOUNTER — IMMUNIZATION (OUTPATIENT)
Dept: FAMILY MEDICINE | Facility: CLINIC | Age: 41
End: 2021-10-09
Payer: COMMERCIAL

## 2021-10-09 PROCEDURE — 90686 IIV4 VACC NO PRSV 0.5 ML IM: CPT | Mod: S$GLB,,, | Performed by: INTERNAL MEDICINE

## 2021-10-09 PROCEDURE — 90686 FLU VACCINE (QUAD) GREATER THAN OR EQUAL TO 3YO PRESERVATIVE FREE IM: ICD-10-PCS | Mod: S$GLB,,, | Performed by: INTERNAL MEDICINE

## 2021-10-09 PROCEDURE — 90471 FLU VACCINE (QUAD) GREATER THAN OR EQUAL TO 3YO PRESERVATIVE FREE IM: ICD-10-PCS | Mod: S$GLB,,, | Performed by: INTERNAL MEDICINE

## 2021-10-09 PROCEDURE — 90471 IMMUNIZATION ADMIN: CPT | Mod: S$GLB,,, | Performed by: INTERNAL MEDICINE

## 2021-10-25 ENCOUNTER — PATIENT MESSAGE (OUTPATIENT)
Dept: PHYSICAL MEDICINE AND REHAB | Facility: CLINIC | Age: 41
End: 2021-10-25
Payer: COMMERCIAL

## 2021-11-01 ENCOUNTER — OFFICE VISIT (OUTPATIENT)
Dept: PHYSICAL MEDICINE AND REHAB | Facility: CLINIC | Age: 41
End: 2021-11-01
Payer: COMMERCIAL

## 2021-11-01 VITALS — WEIGHT: 201 LBS | RESPIRATION RATE: 18 BRPM | HEIGHT: 74 IN | BODY MASS INDEX: 25.8 KG/M2

## 2021-11-01 DIAGNOSIS — M25.522 CHRONIC ELBOW PAIN, LEFT: ICD-10-CM

## 2021-11-01 DIAGNOSIS — G89.29 CHRONIC ELBOW PAIN, LEFT: ICD-10-CM

## 2021-11-01 DIAGNOSIS — M77.12 LEFT LATERAL EPICONDYLITIS: Primary | ICD-10-CM

## 2021-11-01 PROCEDURE — 3008F PR BODY MASS INDEX (BMI) DOCUMENTED: ICD-10-PCS | Mod: CPTII,S$GLB,, | Performed by: PHYSICAL MEDICINE & REHABILITATION

## 2021-11-01 PROCEDURE — 99499 NO LOS: ICD-10-PCS | Mod: S$GLB,,, | Performed by: PHYSICAL MEDICINE & REHABILITATION

## 2021-11-01 PROCEDURE — 1159F MED LIST DOCD IN RCRD: CPT | Mod: CPTII,S$GLB,, | Performed by: PHYSICAL MEDICINE & REHABILITATION

## 2021-11-01 PROCEDURE — 1159F PR MEDICATION LIST DOCUMENTED IN MEDICAL RECORD: ICD-10-PCS | Mod: CPTII,S$GLB,, | Performed by: PHYSICAL MEDICINE & REHABILITATION

## 2021-11-01 PROCEDURE — 99499 UNLISTED E&M SERVICE: CPT | Mod: S$GLB,,, | Performed by: PHYSICAL MEDICINE & REHABILITATION

## 2021-11-01 PROCEDURE — 99999 PR PBB SHADOW E&M-EST. PATIENT-LVL III: CPT | Mod: PBBFAC,,, | Performed by: PHYSICAL MEDICINE & REHABILITATION

## 2021-11-01 PROCEDURE — 3008F BODY MASS INDEX DOCD: CPT | Mod: CPTII,S$GLB,, | Performed by: PHYSICAL MEDICINE & REHABILITATION

## 2021-11-01 PROCEDURE — 99999 PR PBB SHADOW E&M-EST. PATIENT-LVL III: ICD-10-PCS | Mod: PBBFAC,,, | Performed by: PHYSICAL MEDICINE & REHABILITATION

## 2021-11-17 ENCOUNTER — PATIENT MESSAGE (OUTPATIENT)
Dept: PHYSICAL MEDICINE AND REHAB | Facility: CLINIC | Age: 41
End: 2021-11-17
Payer: COMMERCIAL

## 2021-11-17 ENCOUNTER — IMMUNIZATION (OUTPATIENT)
Dept: FAMILY MEDICINE | Facility: CLINIC | Age: 41
End: 2021-11-17
Payer: COMMERCIAL

## 2021-11-17 DIAGNOSIS — Z23 NEED FOR VACCINATION: Primary | ICD-10-CM

## 2021-11-17 PROCEDURE — 0004A COVID-19, MRNA, LNP-S, PF, 30 MCG/0.3 ML DOSE VACCINE: CPT | Mod: PBBFAC | Performed by: FAMILY MEDICINE

## 2022-02-28 ENCOUNTER — PATIENT MESSAGE (OUTPATIENT)
Dept: ADMINISTRATIVE | Facility: HOSPITAL | Age: 42
End: 2022-02-28
Payer: COMMERCIAL

## 2022-03-15 ENCOUNTER — PATIENT MESSAGE (OUTPATIENT)
Dept: FAMILY MEDICINE | Facility: CLINIC | Age: 42
End: 2022-03-15
Payer: COMMERCIAL

## 2022-03-30 ENCOUNTER — LAB VISIT (OUTPATIENT)
Dept: LAB | Facility: HOSPITAL | Age: 42
End: 2022-03-30
Attending: FAMILY MEDICINE
Payer: COMMERCIAL

## 2022-03-30 ENCOUNTER — OFFICE VISIT (OUTPATIENT)
Dept: FAMILY MEDICINE | Facility: CLINIC | Age: 42
End: 2022-03-30
Payer: COMMERCIAL

## 2022-03-30 VITALS
HEIGHT: 74 IN | WEIGHT: 210.44 LBS | HEART RATE: 62 BPM | SYSTOLIC BLOOD PRESSURE: 108 MMHG | BODY MASS INDEX: 27.01 KG/M2 | DIASTOLIC BLOOD PRESSURE: 70 MMHG

## 2022-03-30 DIAGNOSIS — Z23 IMMUNIZATION DUE: ICD-10-CM

## 2022-03-30 DIAGNOSIS — M10.9 GOUTY ARTHRITIS: ICD-10-CM

## 2022-03-30 DIAGNOSIS — Z00.00 WELLNESS EXAMINATION: ICD-10-CM

## 2022-03-30 DIAGNOSIS — Z00.00 WELLNESS EXAMINATION: Primary | ICD-10-CM

## 2022-03-30 DIAGNOSIS — M48.061 SPINAL STENOSIS, LUMBAR REGION, WITHOUT NEUROGENIC CLAUDICATION: ICD-10-CM

## 2022-03-30 LAB
ALBUMIN SERPL BCP-MCNC: 4.5 G/DL (ref 3.5–5.2)
ALP SERPL-CCNC: 34 U/L (ref 55–135)
ALT SERPL W/O P-5'-P-CCNC: 22 U/L (ref 10–44)
ANION GAP SERPL CALC-SCNC: 10 MMOL/L (ref 8–16)
AST SERPL-CCNC: 27 U/L (ref 10–40)
BASOPHILS # BLD AUTO: 0.05 K/UL (ref 0–0.2)
BASOPHILS NFR BLD: 0.5 % (ref 0–1.9)
BILIRUB SERPL-MCNC: 1.8 MG/DL (ref 0.1–1)
BUN SERPL-MCNC: 14 MG/DL (ref 6–20)
CALCIUM SERPL-MCNC: 9.7 MG/DL (ref 8.7–10.5)
CHLORIDE SERPL-SCNC: 102 MMOL/L (ref 95–110)
CHOLEST SERPL-MCNC: 170 MG/DL (ref 120–199)
CHOLEST/HDLC SERPL: 2.7 {RATIO} (ref 2–5)
CO2 SERPL-SCNC: 27 MMOL/L (ref 23–29)
CREAT SERPL-MCNC: 1.2 MG/DL (ref 0.5–1.4)
DIFFERENTIAL METHOD: ABNORMAL
EOSINOPHIL # BLD AUTO: 0.1 K/UL (ref 0–0.5)
EOSINOPHIL NFR BLD: 0.5 % (ref 0–8)
ERYTHROCYTE [DISTWIDTH] IN BLOOD BY AUTOMATED COUNT: 15.6 % (ref 11.5–14.5)
EST. GFR  (AFRICAN AMERICAN): >60 ML/MIN/1.73 M^2
EST. GFR  (NON AFRICAN AMERICAN): >60 ML/MIN/1.73 M^2
ESTIMATED AVG GLUCOSE: 91 MG/DL (ref 68–131)
GLUCOSE SERPL-MCNC: 74 MG/DL (ref 70–110)
HBA1C MFR BLD: 4.8 % (ref 4–5.6)
HCT VFR BLD AUTO: 50.4 % (ref 40–54)
HDLC SERPL-MCNC: 63 MG/DL (ref 40–75)
HDLC SERPL: 37.1 % (ref 20–50)
HGB BLD-MCNC: 15.8 G/DL (ref 14–18)
IMM GRANULOCYTES # BLD AUTO: 0.06 K/UL (ref 0–0.04)
IMM GRANULOCYTES NFR BLD AUTO: 0.6 % (ref 0–0.5)
LDLC SERPL CALC-MCNC: 92.6 MG/DL (ref 63–159)
LYMPHOCYTES # BLD AUTO: 1.2 K/UL (ref 1–4.8)
LYMPHOCYTES NFR BLD: 11.9 % (ref 18–48)
MCH RBC QN AUTO: 27.3 PG (ref 27–31)
MCHC RBC AUTO-ENTMCNC: 31.3 G/DL (ref 32–36)
MCV RBC AUTO: 87 FL (ref 82–98)
MONOCYTES # BLD AUTO: 0.8 K/UL (ref 0.3–1)
MONOCYTES NFR BLD: 8.3 % (ref 4–15)
NEUTROPHILS # BLD AUTO: 7.9 K/UL (ref 1.8–7.7)
NEUTROPHILS NFR BLD: 78.2 % (ref 38–73)
NONHDLC SERPL-MCNC: 107 MG/DL
NRBC BLD-RTO: 0 /100 WBC
PLATELET # BLD AUTO: 317 K/UL (ref 150–450)
PMV BLD AUTO: 9.4 FL (ref 9.2–12.9)
POTASSIUM SERPL-SCNC: 4.2 MMOL/L (ref 3.5–5.1)
PROT SERPL-MCNC: 7 G/DL (ref 6–8.4)
RBC # BLD AUTO: 5.78 M/UL (ref 4.6–6.2)
SODIUM SERPL-SCNC: 139 MMOL/L (ref 136–145)
TRIGL SERPL-MCNC: 72 MG/DL (ref 30–150)
TSH SERPL DL<=0.005 MIU/L-ACNC: 2.45 UIU/ML (ref 0.4–4)
URATE SERPL-MCNC: 9.4 MG/DL (ref 3.4–7)
WBC # BLD AUTO: 10.05 K/UL (ref 3.9–12.7)

## 2022-03-30 PROCEDURE — 99396 PREV VISIT EST AGE 40-64: CPT | Mod: 25,S$GLB,, | Performed by: FAMILY MEDICINE

## 2022-03-30 PROCEDURE — 83036 HEMOGLOBIN GLYCOSYLATED A1C: CPT | Performed by: FAMILY MEDICINE

## 2022-03-30 PROCEDURE — 99396 PR PREVENTIVE VISIT,EST,40-64: ICD-10-PCS | Mod: 25,S$GLB,, | Performed by: FAMILY MEDICINE

## 2022-03-30 PROCEDURE — 90715 TDAP VACCINE 7 YRS/> IM: CPT | Mod: S$GLB,,, | Performed by: FAMILY MEDICINE

## 2022-03-30 PROCEDURE — 3078F DIAST BP <80 MM HG: CPT | Mod: CPTII,S$GLB,, | Performed by: FAMILY MEDICINE

## 2022-03-30 PROCEDURE — 90471 IMMUNIZATION ADMIN: CPT | Mod: S$GLB,,, | Performed by: FAMILY MEDICINE

## 2022-03-30 PROCEDURE — 99999 PR PBB SHADOW E&M-EST. PATIENT-LVL III: CPT | Mod: PBBFAC,,, | Performed by: FAMILY MEDICINE

## 2022-03-30 PROCEDURE — 3008F PR BODY MASS INDEX (BMI) DOCUMENTED: ICD-10-PCS | Mod: CPTII,S$GLB,, | Performed by: FAMILY MEDICINE

## 2022-03-30 PROCEDURE — 86803 HEPATITIS C AB TEST: CPT | Performed by: FAMILY MEDICINE

## 2022-03-30 PROCEDURE — 90471 TDAP VACCINE GREATER THAN OR EQUAL TO 7YO IM: ICD-10-PCS | Mod: S$GLB,,, | Performed by: FAMILY MEDICINE

## 2022-03-30 PROCEDURE — 84550 ASSAY OF BLOOD/URIC ACID: CPT | Performed by: FAMILY MEDICINE

## 2022-03-30 PROCEDURE — 84443 ASSAY THYROID STIM HORMONE: CPT | Performed by: FAMILY MEDICINE

## 2022-03-30 PROCEDURE — 3008F BODY MASS INDEX DOCD: CPT | Mod: CPTII,S$GLB,, | Performed by: FAMILY MEDICINE

## 2022-03-30 PROCEDURE — 87389 HIV-1 AG W/HIV-1&-2 AB AG IA: CPT | Performed by: FAMILY MEDICINE

## 2022-03-30 PROCEDURE — 1160F RVW MEDS BY RX/DR IN RCRD: CPT | Mod: CPTII,S$GLB,, | Performed by: FAMILY MEDICINE

## 2022-03-30 PROCEDURE — 85025 COMPLETE CBC W/AUTO DIFF WBC: CPT | Performed by: FAMILY MEDICINE

## 2022-03-30 PROCEDURE — 3074F SYST BP LT 130 MM HG: CPT | Mod: CPTII,S$GLB,, | Performed by: FAMILY MEDICINE

## 2022-03-30 PROCEDURE — 1160F PR REVIEW ALL MEDS BY PRESCRIBER/CLIN PHARMACIST DOCUMENTED: ICD-10-PCS | Mod: CPTII,S$GLB,, | Performed by: FAMILY MEDICINE

## 2022-03-30 PROCEDURE — 3078F PR MOST RECENT DIASTOLIC BLOOD PRESSURE < 80 MM HG: ICD-10-PCS | Mod: CPTII,S$GLB,, | Performed by: FAMILY MEDICINE

## 2022-03-30 PROCEDURE — 1159F MED LIST DOCD IN RCRD: CPT | Mod: CPTII,S$GLB,, | Performed by: FAMILY MEDICINE

## 2022-03-30 PROCEDURE — 3074F PR MOST RECENT SYSTOLIC BLOOD PRESSURE < 130 MM HG: ICD-10-PCS | Mod: CPTII,S$GLB,, | Performed by: FAMILY MEDICINE

## 2022-03-30 PROCEDURE — 99999 PR PBB SHADOW E&M-EST. PATIENT-LVL III: ICD-10-PCS | Mod: PBBFAC,,, | Performed by: FAMILY MEDICINE

## 2022-03-30 PROCEDURE — 36415 COLL VENOUS BLD VENIPUNCTURE: CPT | Mod: PN | Performed by: FAMILY MEDICINE

## 2022-03-30 PROCEDURE — 80053 COMPREHEN METABOLIC PANEL: CPT | Performed by: FAMILY MEDICINE

## 2022-03-30 PROCEDURE — 1159F PR MEDICATION LIST DOCUMENTED IN MEDICAL RECORD: ICD-10-PCS | Mod: CPTII,S$GLB,, | Performed by: FAMILY MEDICINE

## 2022-03-30 PROCEDURE — 90715 TDAP VACCINE GREATER THAN OR EQUAL TO 7YO IM: ICD-10-PCS | Mod: S$GLB,,, | Performed by: FAMILY MEDICINE

## 2022-03-30 PROCEDURE — 80061 LIPID PANEL: CPT | Performed by: FAMILY MEDICINE

## 2022-03-30 NOTE — PROGRESS NOTES
THIS DOCUMENT WAS MADE IN PART WITH VOICE RECOGNITION SOFTWARE.  OCCASIONALLY THIS SOFTWARE WILL MISINTERPRET WORDS OR PHRASES.    Assessment and Plan:    1. Wellness examination  CBC Auto Differential    Comprehensive Metabolic Panel    Lipid Panel    TSH    Hemoglobin A1C    Urinalysis, Reflex to Urine Culture Urine, Clean Catch    HIV 1/2 Ag/Ab (4th Gen)    Hepatitis C Antibody    Uric Acid   2. Immunization due  (In Office Administered) Tdap Vaccine   3. Gouty arthritis     4. Spinal stenosis, lumbar region, without neurogenic claudication         Plan  Chart updated  Wellness labs ordered  Monitor for increase in gout flare frequency, consider prophylactic therapy if needed  Tetanus vaccine today      ______________________________________________________________________  Subjective:    Chief Complaint:  Chief Complaint   Patient presents with    Annual Exam     Poss due for lab        HPI:  Ashu is a 41 y.o. year old     Lumbar spine pathology  Workup began in 2012.  Diagnosed with multilevel degenerative disc disease, spondylosis, facet arthropathy.  Has underwent multiple ANNE procedures and physical therapy.  Pain controlled.  No recent flares.  No motor or sensory deficits.     History of laceration to right ulnar wrist with ulnar nerve and artery laceration   Injury occurred February 2019 via shattered wine glass.  Also had tendons lacerated.      Erectile dysfunction  Treated with Cialis in the past.  All resolved. Not on medication.  Reports being under lot of stress at the time of initial symptom onset    Gout  New dx   2 flares in last year        Health maintenance  Due for flu vaccine    Past Medical History:  Past Medical History:   Diagnosis Date    Acne     Damage to right ulnar nerve     Gout, arthritis     History of ED     Lumbar degenerative disc disease        Past Surgical History:  Past Surgical History:   Procedure Laterality Date    CARPAL TUNNEL RELEASE Right 02/16/2019     Procedure: RELEASE, CARPAL TUNNEL;  Surgeon: FELICITAS Ferreira MD;  Location: [facility] OR;  Service: General;  Laterality: Right;    FLEXOR TENDON REPAIR Right 02/16/2019    Procedure: FCU PRIMARY REPAIR;  Surgeon: FELICITAS Ferreira MD;  Location: [facility] OR;  Service: General;  Laterality: Right;    IRRIGATION AND DEBRIDEMENT OF UPPER EXTREMITY Right 02/11/2019    Procedure: EXPLORATION, IRRIGATION AND DEBRIDEMENT OF HAND LACERATIOM;  Surgeon: FELICITAS Ferreira MD;  Location: [facility] OR;  Service: General;  Laterality: Right;    NERVE REPAIR Right 02/16/2019    Procedure: REPAIR, NERVE, DEEP MOTOR BRANCH x1, ULNAR SENSORY BRANCH x2;  Surgeon: FELICITAS Ferreira MD;  Location: [facility] OR;  Service: General;  Laterality: Right;    PERCUTANEOUS TENOTOMY OF ELBOW Left 09/16/2021    Procedure: TENOTOMY, ELBOW, PERCUTANEOUS left common extensor tendon with ultrasound guidance (tenex);  Surgeon: Sergey Tineo MD;  Location: [facility] OR;  Service: Orthopedics;  Laterality: Left;  Total Tenex time: 2min 14 seconds    REPAIR OF TRANSECTED ARTERY Right 02/11/2019    Procedure: REPAIR OF ULNAR ARTERY, TRANSECTED AND HAND LACERATION REPAIR;  Surgeon: FELICITAS Ferreira MD;  Location: [facility] OR;  Service: General;  Laterality: Right;    SKIN SURGERY  1984    mass behind or in right EOC removed benign    TENOPLASTY OF HAND Right 02/16/2019    Procedure: FDP TENDON REPAIR, RING AND SMALL FINGER, FDS TENDON REPAIR, RING AND SMALL FINGER;  Surgeon: FELICITAS Ferreira MD;  Location: [facility] OR;  Service: General;  Laterality: Right;    TONSILLECTOMY         Family History:  Family History   Problem Relation Age of Onset    Heart disease Mother 55    Hyperlipidemia Mother     Hypertension Mother     Hyperlipidemia Father     Heart disease Father 63    Arthritis Father     Dementia Father     Stroke Father     No Known Problems Brother     No Known Problems Daughter     No Known Problems Son     Colon polyps Maternal Aunt     Heart  disease Paternal Uncle     Cancer Maternal Grandmother     Diabetes Maternal Grandmother        Social History:  Social History     Socioeconomic History    Marital status:      Spouse name: Tete    Number of children: 2   Occupational History    Occupation: [employer]    Tobacco Use    Smoking status: Never Smoker    Smokeless tobacco: Never Used   Substance and Sexual Activity    Alcohol use: Yes     Alcohol/week: 8.0 standard drinks     Types: 4 Glasses of wine, 4 Cans of beer per week    Drug use: No    Sexual activity: Yes     Partners: Female   Social History Narrative    Job :     Exercise : 2 - 3 x per week : body pump     Diet : AM fasting / Try to eat healthy / limit carbs        Medications:  Current Outpatient Medications on File Prior to Visit   Medication Sig Dispense Refill    clindamycin (CLEOCIN T) 1 % Swab APPLY TO AFFECTED AREA TWICE A DAY 60 each 11    colchicine (COLCRYS) 0.6 mg tablet TAKE 2 TAB ON DAY ONE AND THEN ONE TAB DAILY UNTIL SYMPTOMS RESOLVE 90 tablet 1     Current Facility-Administered Medications on File Prior to Visit   Medication Dose Route Frequency Provider Last Rate Last Admin    ceFAZolin injection 1 g  1 g Intravenous On Call Procedure FELICITAS Ferreira MD   2 g at 02/16/19 0937    lactated ringers infusion   Intravenous Continuous Inderjit Ospina  mL/hr at 02/16/19 0847 New Bag at 02/16/19 1306       Allergies:  Patient has no known allergies.    Immunizations:  Immunization History   Administered Date(s) Administered    COVID-19, MRNA, LN-S, PF (Pfizer) (Purple Cap) 04/02/2021, 04/23/2021, 11/17/2021    Influenza - Quadrivalent - PF *Preferred* (6 months and older) 10/09/2021    Influenza Split 10/02/2012    Tdap 11/08/2010       Review of Systems:  Review of Systems   All other systems reviewed and are negative.      Objective:    Vitals:  Vitals:    03/30/22 0739   BP: 108/70   Pulse: 62   Weight: 95.5 kg  "(210 lb 6.9 oz)   Height: 6' 2" (1.88 m)   PainSc: 0-No pain       Physical Exam  Vitals reviewed.   Constitutional:       General: He is not in acute distress.  HENT:      Head: Normocephalic and atraumatic.   Eyes:      Pupils: Pupils are equal, round, and reactive to light.   Cardiovascular:      Rate and Rhythm: Normal rate and regular rhythm.      Heart sounds: No murmur heard.    No friction rub.   Pulmonary:      Effort: Pulmonary effort is normal.      Breath sounds: Normal breath sounds.   Abdominal:      General: Bowel sounds are normal. There is no distension.      Palpations: Abdomen is soft.      Tenderness: There is no abdominal tenderness.   Musculoskeletal:      Cervical back: Neck supple.   Skin:     General: Skin is warm and dry.      Findings: No rash.   Psychiatric:         Behavior: Behavior normal.             Negro Garcia MD  Family Medicine      "

## 2022-03-31 LAB
HCV AB SERPL QL IA: NEGATIVE
HIV 1+2 AB+HIV1 P24 AG SERPL QL IA: NEGATIVE

## 2022-05-13 ENCOUNTER — PATIENT MESSAGE (OUTPATIENT)
Dept: FAMILY MEDICINE | Facility: CLINIC | Age: 42
End: 2022-05-13
Payer: COMMERCIAL

## 2022-05-13 RX ORDER — COLCHICINE 0.6 MG/1
TABLET ORAL
Qty: 90 TABLET | Refills: 1 | Status: SHIPPED | OUTPATIENT
Start: 2022-05-13 | End: 2022-06-05

## 2022-05-13 NOTE — TELEPHONE ENCOUNTER
No new care gaps identified.  [facility] Embedded Care Gaps. Reference number: [id number]. 5/13/2022   10:55:55 AM MEAGHANT

## 2022-06-04 NOTE — TELEPHONE ENCOUNTER
No new care gaps identified.  [facility] Embedded Care Gaps. Reference number: [id number]. 6/04/2022   12:31:56 PM CDT

## 2022-06-05 RX ORDER — COLCHICINE 0.6 MG/1
TABLET ORAL
Qty: 90 TABLET | Refills: 3 | Status: SHIPPED | OUTPATIENT
Start: 2022-06-05

## 2022-06-06 NOTE — TELEPHONE ENCOUNTER
Refill Authorization Note   Ashu Thomas  is requesting a refill authorization.  Brief Assessment and Rationale for Refill:        Medication Therapy Plan:       Medication Reconciliation Completed: No   Comments:     No Care Gaps recommended.     Note composed:10:16 PM 06/05/2022

## 2022-09-24 ENCOUNTER — IMMUNIZATION (OUTPATIENT)
Dept: FAMILY MEDICINE | Facility: CLINIC | Age: 42
End: 2022-09-24
Payer: COMMERCIAL

## 2022-09-24 PROCEDURE — 90471 IMMUNIZATION ADMIN: CPT | Mod: S$GLB,,, | Performed by: FAMILY MEDICINE

## 2022-09-24 PROCEDURE — 90686 IIV4 VACC NO PRSV 0.5 ML IM: CPT | Mod: S$GLB,,, | Performed by: FAMILY MEDICINE

## 2022-09-24 PROCEDURE — 90471 FLU VACCINE (QUAD) GREATER THAN OR EQUAL TO 3YO PRESERVATIVE FREE IM: ICD-10-PCS | Mod: S$GLB,,, | Performed by: FAMILY MEDICINE

## 2022-09-24 PROCEDURE — 90686 FLU VACCINE (QUAD) GREATER THAN OR EQUAL TO 3YO PRESERVATIVE FREE IM: ICD-10-PCS | Mod: S$GLB,,, | Performed by: FAMILY MEDICINE

## 2022-12-05 ENCOUNTER — TELEPHONE (OUTPATIENT)
Dept: PHYSICAL MEDICINE AND REHAB | Facility: CLINIC | Age: 42
End: 2022-12-05
Payer: COMMERCIAL

## 2022-12-05 ENCOUNTER — PATIENT MESSAGE (OUTPATIENT)
Dept: PHYSICAL MEDICINE AND REHAB | Facility: CLINIC | Age: 42
End: 2022-12-05
Payer: COMMERCIAL

## 2022-12-05 NOTE — TELEPHONE ENCOUNTER
----- Message from Taniya Oliveira sent at 12/5/2022 12:22 PM CST -----  Type: Patient Call Back    Who called: Self     What is the request in detail: patient reached out via Live Chat trying to schedule with Dr. Roberson to have Tenex procedure for Right tennis elbow. . Epic did not offer any appointment with Dr. Polanco. Please call     [facility]     Would the patient rather a call back or a response via [facility]?  Call     Best call back number:.[phone]

## 2023-01-10 ENCOUNTER — OFFICE VISIT (OUTPATIENT)
Dept: ORTHOPEDICS | Facility: CLINIC | Age: 43
End: 2023-01-10
Payer: COMMERCIAL

## 2023-01-10 ENCOUNTER — HOSPITAL ENCOUNTER (OUTPATIENT)
Dept: RADIOLOGY | Facility: HOSPITAL | Age: 43
Discharge: HOME | End: 2023-01-10
Attending: FAMILY MEDICINE
Payer: COMMERCIAL

## 2023-01-10 VITALS — HEIGHT: 74 IN | BODY MASS INDEX: 27.02 KG/M2 | WEIGHT: 210.56 LBS

## 2023-01-10 DIAGNOSIS — M77.11 RIGHT LATERAL EPICONDYLITIS: ICD-10-CM

## 2023-01-10 DIAGNOSIS — M25.521 RIGHT ELBOW PAIN: ICD-10-CM

## 2023-01-10 DIAGNOSIS — M77.11 RIGHT LATERAL EPICONDYLITIS: Primary | ICD-10-CM

## 2023-01-10 PROCEDURE — 99204 OFFICE O/P NEW MOD 45 MIN: CPT | Mod: 25,S$GLB,, | Performed by: FAMILY MEDICINE

## 2023-01-10 PROCEDURE — 3008F PR BODY MASS INDEX (BMI) DOCUMENTED: ICD-10-PCS | Mod: CPTII,S$GLB,, | Performed by: FAMILY MEDICINE

## 2023-01-10 PROCEDURE — 73080 X-RAY EXAM OF ELBOW: CPT | Mod: 26,RT,, | Performed by: RADIOLOGY

## 2023-01-10 PROCEDURE — 1159F MED LIST DOCD IN RCRD: CPT | Mod: CPTII,S$GLB,, | Performed by: FAMILY MEDICINE

## 2023-01-10 PROCEDURE — 20605 DRAIN/INJ JOINT/BURSA W/O US: CPT | Mod: RT,S$GLB,, | Performed by: FAMILY MEDICINE

## 2023-01-10 PROCEDURE — 73080 X-RAY EXAM OF ELBOW: CPT | Mod: TC,FY,RT

## 2023-01-10 PROCEDURE — 99204 PR OFFICE/OUTPT VISIT, NEW, LEVL IV, 45-59 MIN: ICD-10-PCS | Mod: 25,S$GLB,, | Performed by: FAMILY MEDICINE

## 2023-01-10 PROCEDURE — 99999 PR PBB SHADOW E&M-EST. PATIENT-LVL III: CPT | Mod: PBBFAC,,, | Performed by: FAMILY MEDICINE

## 2023-01-10 PROCEDURE — 99999 PR PBB SHADOW E&M-EST. PATIENT-LVL III: ICD-10-PCS | Mod: PBBFAC,,, | Performed by: FAMILY MEDICINE

## 2023-01-10 PROCEDURE — 3008F BODY MASS INDEX DOCD: CPT | Mod: CPTII,S$GLB,, | Performed by: FAMILY MEDICINE

## 2023-01-10 PROCEDURE — 1159F PR MEDICATION LIST DOCUMENTED IN MEDICAL RECORD: ICD-10-PCS | Mod: CPTII,S$GLB,, | Performed by: FAMILY MEDICINE

## 2023-01-10 PROCEDURE — 73080 XR ELBOW COMPLETE 3 VIEW RIGHT: ICD-10-PCS | Mod: 26,RT,, | Performed by: RADIOLOGY

## 2023-01-10 PROCEDURE — 20605 INTERMEDIATE JOINT ASPIRATION/INJECTION: ICD-10-PCS | Mod: RT,S$GLB,, | Performed by: FAMILY MEDICINE

## 2023-01-10 RX ORDER — METHYLPREDNISOLONE ACETATE 80 MG/ML
80 INJECTION, SUSPENSION INTRA-ARTICULAR; INTRALESIONAL; INTRAMUSCULAR; SOFT TISSUE
Status: DISCONTINUED | OUTPATIENT
Start: 2023-01-10 | End: 2023-01-10 | Stop reason: HOSPADM

## 2023-01-10 RX ADMIN — METHYLPREDNISOLONE ACETATE 80 MG: 80 INJECTION, SUSPENSION INTRA-ARTICULAR; INTRALESIONAL; INTRAMUSCULAR; SOFT TISSUE at 08:01

## 2023-01-10 NOTE — PROGRESS NOTES
Subjective:      Patient ID: Ashu Thomas is a 42 y.o. male.    Chief Complaint: Pain of the Right Elbow    This is a new patient here today with complaints of right elbow pain.  States he believes it has been present since June of 2022.  He is a history of tennis elbow on the left side that was treated many years ago with a combination of steroid injections which provided temporary relief as well as other treatments.  Dr. Tineo performed a Tenex procedure on his left elbow in September of 2021 which provided great relief.  This was also followed up with a whole blood injection which eliminated the last bit of his pain.  He is no pain in his left elbow today.  The right elbow feels similar to the way the left elbow felt.  He has been resting it since November which did alleviate some pain but is having constant pain still made worse with any type of activity.  Has been unable to go to the gym fully because of pain.  Has tried occasional ibuprofen with minimal relief.      Review of Systems   Constitutional: Negative for chills and decreased appetite.   HENT:  Negative for congestion and sore throat.    Eyes:  Negative for blurred vision.   Cardiovascular:  Negative for chest pain, dyspnea on exertion and palpitations.   Respiratory:  Negative for cough and shortness of breath.    Skin:  Negative for rash.   Neurological:  Negative for difficulty with concentration, disturbances in coordination and headaches.   Psychiatric/Behavioral:  Negative for altered mental status, depression, hallucinations, memory loss and suicidal ideas.        Objective:            General    Nursing note and vitals reviewed.  Constitutional: He is oriented to person, place, and time. He appears well-developed and well-nourished.   HENT:   Nose: Nose normal.   Eyes: EOM are normal. Pupils are equal, round, and reactive to light.   Neck: Neck supple.   Cardiovascular:  Normal rate.            Pulmonary/Chest: Effort normal.   Abdominal:  Soft.   Neurological: He is alert and oriented to person, place, and time. He has normal reflexes.   Psychiatric: He has a normal mood and affect. His behavior is normal. Judgment and thought content normal.             Right Hand/Wrist Exam     Other     Neuorologic Exam    Ulnar Distribution: normal  Radial Distribution: normal      Right Elbow Exam     Inspection   Scars: present  Effusion: present    Pain   The patient exhibits pain of the lateral epicondyle    Swelling   The patient is swollen on the lateral epicondyle    Tenderness   The patient is tender to palpation of the lateral epicondyle.     Range of Motion   Extension:  20   Flexion:  140   Pronation:  normal   Supination:  normal     Tests   Varus: negative  Valgus: negative  Tinel's sign (cubital tunnel): negative  Tennis Elbow: moderate  Golfer's Elbow: negative    Other   Sensation: normal      Left Elbow Exam   Left elbow exam is normal.        Muscle Strength   Right Upper Extremity   Wrist extension: 5/5   Wrist flexion: 5/5   : 5/5   Elbow Pronation:  5/5   Elbow Supination:  5/5   Elbow Extension: 5/5  Elbow Flexion: 5/5    Vascular Exam     Right Pulses      Radial:                    2+              Assessment:       Encounter Diagnoses   Name Primary?    Right elbow pain     Right lateral epicondylitis Yes          Plan:       Ashu was seen today for pain.    Diagnoses and all orders for this visit:    Right lateral epicondylitis  -     X-Ray Elbow Complete 3 views Right; Future    Right elbow pain  -     X-Ray Elbow Complete 3 views Right; Future         He would be a good candidate for Tenex on the right elbow as well.  Will get an x-ray of his elbow today.  Tenex will hopefully be available around March of 2023.  I offered him a steroid injection into the right elbow today and he agreed with this.  I also gave him information on PRP should he decide to use that as a treatment option.    R lateral epicondyleIntermediate Joint  Aspiration/Injection    Date/Time: 1/10/2023 8:00 AM  Performed by: Kasi Russell MD  Authorized by: Kasi Russell MD     Consent Done?:  Yes (Verbal)  Indications:  Pain and arthritis  Site marked: The procedure site was marked    Timeout: Prior to procedure the correct patient, procedure, and site was verified      Location:  Elbow  Prep: Patient was prepped and draped in usual sterile fashion    Ultrasonic Guidance for needle placement: No  Needle size:  25 G  Approach:  Anterolateral  Medications:  80 mg methylPREDNISolone acetate 80 mg/mL  Patient tolerance:  Patient tolerated the procedure well with no immediate complications

## 2023-02-05 DIAGNOSIS — L70.0 ACNE VULGARIS: ICD-10-CM

## 2023-02-05 RX ORDER — CLINDAMYCIN PHOSPHATE 10 MG/ML
SOLUTION TOPICAL 2 TIMES DAILY
Status: CANCELLED | OUTPATIENT
Start: 2023-02-05

## 2023-02-06 ENCOUNTER — PATIENT MESSAGE (OUTPATIENT)
Dept: FAMILY MEDICINE | Facility: CLINIC | Age: 43
End: 2023-02-06
Payer: COMMERCIAL

## 2023-04-18 DIAGNOSIS — M25.552 LEFT HIP PAIN: Primary | ICD-10-CM

## 2023-04-24 ENCOUNTER — HOSPITAL ENCOUNTER (OUTPATIENT)
Dept: RADIOLOGY | Facility: HOSPITAL | Age: 43
Discharge: HOME | End: 2023-04-24
Attending: ORTHOPAEDIC SURGERY
Payer: COMMERCIAL

## 2023-04-24 ENCOUNTER — OFFICE VISIT (OUTPATIENT)
Dept: ORTHOPEDICS | Facility: CLINIC | Age: 43
End: 2023-04-24
Payer: COMMERCIAL

## 2023-04-24 VITALS — WEIGHT: 210 LBS | BODY MASS INDEX: 26.95 KG/M2 | HEIGHT: 74 IN

## 2023-04-24 DIAGNOSIS — M25.552 LEFT HIP PAIN: Primary | ICD-10-CM

## 2023-04-24 DIAGNOSIS — M25.552 LEFT HIP PAIN: ICD-10-CM

## 2023-04-24 PROCEDURE — 1160F RVW MEDS BY RX/DR IN RCRD: CPT | Mod: CPTII,S$GLB,, | Performed by: ORTHOPAEDIC SURGERY

## 2023-04-24 PROCEDURE — 99999 PR PBB SHADOW E&M-EST. PATIENT-LVL III: CPT | Mod: PBBFAC,,, | Performed by: ORTHOPAEDIC SURGERY

## 2023-04-24 PROCEDURE — 99213 PR OFFICE/OUTPT VISIT, EST, LEVL III, 20-29 MIN: ICD-10-PCS | Mod: S$GLB,,, | Performed by: ORTHOPAEDIC SURGERY

## 2023-04-24 PROCEDURE — 3008F PR BODY MASS INDEX (BMI) DOCUMENTED: ICD-10-PCS | Mod: CPTII,S$GLB,, | Performed by: ORTHOPAEDIC SURGERY

## 2023-04-24 PROCEDURE — 1160F PR REVIEW ALL MEDS BY PRESCRIBER/CLIN PHARMACIST DOCUMENTED: ICD-10-PCS | Mod: CPTII,S$GLB,, | Performed by: ORTHOPAEDIC SURGERY

## 2023-04-24 PROCEDURE — 73502 X-RAY EXAM HIP UNI 2-3 VIEWS: CPT | Mod: TC,PO,LT

## 2023-04-24 PROCEDURE — 99999 PR PBB SHADOW E&M-EST. PATIENT-LVL III: ICD-10-PCS | Mod: PBBFAC,,, | Performed by: ORTHOPAEDIC SURGERY

## 2023-04-24 PROCEDURE — 99213 OFFICE O/P EST LOW 20 MIN: CPT | Mod: S$GLB,,, | Performed by: ORTHOPAEDIC SURGERY

## 2023-04-24 PROCEDURE — 1159F MED LIST DOCD IN RCRD: CPT | Mod: CPTII,S$GLB,, | Performed by: ORTHOPAEDIC SURGERY

## 2023-04-24 PROCEDURE — 73502 XR HIP WITH PELVIS WHEN PERFORMED, 2 OR 3 VIEWS LEFT: ICD-10-PCS | Mod: 26,LT,, | Performed by: RADIOLOGY

## 2023-04-24 PROCEDURE — 1159F PR MEDICATION LIST DOCUMENTED IN MEDICAL RECORD: ICD-10-PCS | Mod: CPTII,S$GLB,, | Performed by: ORTHOPAEDIC SURGERY

## 2023-04-24 PROCEDURE — 73502 X-RAY EXAM HIP UNI 2-3 VIEWS: CPT | Mod: 26,LT,, | Performed by: RADIOLOGY

## 2023-04-24 PROCEDURE — 3008F BODY MASS INDEX DOCD: CPT | Mod: CPTII,S$GLB,, | Performed by: ORTHOPAEDIC SURGERY

## 2023-04-24 NOTE — PROGRESS NOTES
42 years old left hip pain for 2-3 years time no trauma aching pain 2 on good days 7 on bad days.  Seems made worse with activity somewhat relieved with rest.  Reports have had injections in the past    Exam shows internal external rotation of the hip reproduces symptoms no signs infection instability     X-rays show arthritic changes    Assessment: Left hip arthrosis    Plan:  Continue with symptomatic care, we did discuss with him the possibility of hip replacement surgery which will give him information on, follow-up as needed    Patient seen as a consult from Dr. Negro Garcia, communication via epic

## 2023-06-30 ENCOUNTER — OFFICE VISIT (OUTPATIENT)
Dept: ORTHOPEDICS | Facility: CLINIC | Age: 43
End: 2023-06-30
Payer: COMMERCIAL

## 2023-06-30 DIAGNOSIS — M77.11 RIGHT LATERAL EPICONDYLITIS: Primary | ICD-10-CM

## 2023-06-30 PROCEDURE — 1159F PR MEDICATION LIST DOCUMENTED IN MEDICAL RECORD: ICD-10-PCS | Mod: CPTII,S$GLB,, | Performed by: PHYSICIAN ASSISTANT

## 2023-06-30 PROCEDURE — 1159F MED LIST DOCD IN RCRD: CPT | Mod: CPTII,S$GLB,, | Performed by: PHYSICIAN ASSISTANT

## 2023-06-30 PROCEDURE — 1160F PR REVIEW ALL MEDS BY PRESCRIBER/CLIN PHARMACIST DOCUMENTED: ICD-10-PCS | Mod: CPTII,S$GLB,, | Performed by: PHYSICIAN ASSISTANT

## 2023-06-30 PROCEDURE — 99999 PR PBB SHADOW E&M-EST. PATIENT-LVL III: CPT | Mod: PBBFAC,,, | Performed by: PHYSICIAN ASSISTANT

## 2023-06-30 PROCEDURE — 20551 TENDON ORIGIN: ICD-10-PCS | Mod: RT,S$GLB,, | Performed by: PHYSICIAN ASSISTANT

## 2023-06-30 PROCEDURE — 99203 OFFICE O/P NEW LOW 30 MIN: CPT | Mod: 25,S$GLB,, | Performed by: PHYSICIAN ASSISTANT

## 2023-06-30 PROCEDURE — 20551 NJX 1 TENDON ORIGIN/INSJ: CPT | Mod: RT,S$GLB,, | Performed by: PHYSICIAN ASSISTANT

## 2023-06-30 PROCEDURE — 99203 PR OFFICE/OUTPT VISIT, NEW, LEVL III, 30-44 MIN: ICD-10-PCS | Mod: 25,S$GLB,, | Performed by: PHYSICIAN ASSISTANT

## 2023-06-30 PROCEDURE — 1160F RVW MEDS BY RX/DR IN RCRD: CPT | Mod: CPTII,S$GLB,, | Performed by: PHYSICIAN ASSISTANT

## 2023-06-30 PROCEDURE — 99999 PR PBB SHADOW E&M-EST. PATIENT-LVL III: ICD-10-PCS | Mod: PBBFAC,,, | Performed by: PHYSICIAN ASSISTANT

## 2023-06-30 RX ADMIN — TRIAMCINOLONE ACETONIDE 40 MG: 40 INJECTION, SUSPENSION INTRA-ARTICULAR; INTRAMUSCULAR at 12:06

## 2023-06-30 NOTE — PROGRESS NOTES
6/30/2023    Chief Complaint:  Chief Complaint   Patient presents with    Right Elbow - Pain       HPI:  Ashu Thomas is a 42 y.o. male, who presents to clinic today for evaluation of his tennis elbow of the right elbow.  States he is had tennis elbow for some time.  States he had an injection of the right elbow in the past.  States pain on average is 5/10.  States pain is worse with activity.  States pain is better with rest.  States he has tried physical therapy, splinting, and anti-inflammatories with no significant relief.  States he is here today to discuss further treatment options.  Denies any other complaints at this time.    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     Gout, arthritis     History of ED     Lumbar degenerative disc disease        PSHX:  Past Surgical History:   Procedure Laterality Date    CARPAL TUNNEL RELEASE Right 02/16/2019    Procedure: RELEASE, CARPAL TUNNEL;  Surgeon: FELICITAS Ferreira MD;  Location: [facility] OR;  Service: General;  Laterality: Right;    FLEXOR TENDON REPAIR Right 02/16/2019    Procedure: FCU PRIMARY REPAIR;  Surgeon: FELICITAS Ferreira MD;  Location: [facility] OR;  Service: General;  Laterality: Right;    IRRIGATION AND DEBRIDEMENT OF UPPER EXTREMITY Right 02/11/2019    Procedure: EXPLORATION, IRRIGATION AND DEBRIDEMENT OF HAND LACERATIOM;  Surgeon: FELICITAS Ferreira MD;  Location: [facility] OR;  Service: General;  Laterality: Right;    NERVE REPAIR Right 02/16/2019    Procedure: REPAIR, NERVE, DEEP MOTOR BRANCH x1, ULNAR SENSORY BRANCH x2;  Surgeon: FELICITAS Ferreira MD;  Location: [facility] OR;  Service: General;  Laterality: Right;    PERCUTANEOUS TENOTOMY OF ELBOW Left 09/16/2021    Procedure: TENOTOMY, ELBOW, PERCUTANEOUS left common extensor tendon with ultrasound guidance (tenex);  Surgeon: Sergey Tineo MD;  Location: [facility] OR;  Service: Orthopedics;  Laterality: Left;  Total Tenex time: 2min 14 seconds    REPAIR OF TRANSECTED  ARTERY Right 02/11/2019    Procedure: REPAIR OF ULNAR ARTERY, TRANSECTED AND HAND LACERATION REPAIR;  Surgeon: FELICITAS Ferreira MD;  Location: [facility] OR;  Service: General;  Laterality: Right;    SKIN SURGERY  1984    mass behind or in right EOC removed benign    TENOPLASTY OF HAND Right 02/16/2019    Procedure: FDP TENDON REPAIR, RING AND SMALL FINGER, FDS TENDON REPAIR, RING AND SMALL FINGER;  Surgeon: FELICITAS Ferreira MD;  [facility];  Service: General;  Laterality: Right;    TONSILLECTOMY         FMHX:  Family History   Problem Relation Age of Onset    Heart disease Mother 55    Hyperlipidemia Mother     Hypertension Mother     Hyperlipidemia Father     Heart disease Father 63    Arthritis Father     Dementia Father     Stroke Father     No Known Problems Brother     No Known Problems Daughter     No Known Problems Son     Colon polyps Maternal Aunt     Heart disease Paternal Uncle     Cancer Maternal Grandmother     Diabetes Maternal Grandmother        SOCHX:  Social History     Tobacco Use    Smoking status: Never    Smokeless tobacco: Never   Substance Use Topics    Alcohol use: Yes     Alcohol/week: 8.0 standard drinks     Types: 4 Glasses of wine, 4 Cans of beer per week       ALLERGIES:  Patient has no known allergies.    CURRENT MEDICATIONS:  Current Outpatient Medications on File Prior to Visit   Medication Sig Dispense Refill    clindamycin (CLEOCIN T) 1 % Swab APPLY TO AFFECTED AREA TWICE A DAY 60 each 11    colchicine (COLCRYS) 0.6 mg tablet TAKE 2 TAB ON DAY ONE AND THEN ONE TAB DAILY UNTIL SYMPTOMS RESOLVE 90 tablet 3     Current Facility-Administered Medications on File Prior to Visit   Medication Dose Route Frequency Provider Last Rate Last Admin    ceFAZolin injection 1 g  1 g Intravenous On Call Procedure FELICITAS Ferreira MD   2 g at 02/16/19 0937    lactated ringers infusion   Intravenous Continuous Inderjit Ospina  mL/hr at 02/16/19 0847 New Bag at 02/16/19 1306        REVIEW OF SYSTEMS:  Review of Systems   Constitutional: Negative.    HENT: Negative.     Eyes: Negative.    Respiratory: Negative.     Cardiovascular: Negative.    Gastrointestinal: Negative.    Genitourinary: Negative.    Musculoskeletal:  Positive for joint pain.   Skin: Negative.    Neurological: Negative.    Endo/Heme/Allergies: Negative.    Psychiatric/Behavioral: Negative.       GENERAL PHYSICAL EXAM:   There were no vitals taken for this visit.   GEN: well developed, well nourished, no acute distress   HENT: Normocephalic, atraumatic   EYES: No discharge, conjunctiva normal   NECK: Supple, non-tender   PULM: No wheezing, no respiratory distress   CV: RRR   ABD: Soft, non-tender    ORTHO EXAM:   Examination of the right elbow reveals no edema, erythema, ecchymosis, or skin breakdown.  Able make composite fist and fully extend all fingers.  Full intact range of motion of the right wrist.  Full intact range of motion of the right elbow.  Tenderness to palpation over the area of the lateral epicondyle.  Tenderness with resisted wrist extension.  5/5 /intrinsic strength.  5/5 wrist extension/flexion strength.  Normal sensation in the radial, ulnar, median nerve distributions.  Capillary refill less than 2 seconds in all fingers.    RADIOLOGY:   None.    ASSESSMENT:   Lateral epicondylitis of the right elbow    PLAN:  1. I discussed with Ashu Thomas the lateral epicondylitis pathology and treatment options in detail during today's visit.  After treatment options were discussed, we decided the best course of action at this time is to proceed with a steroid injection into the extensor tendon origin of the right elbow as he has failed conservative treatment.  He verbally agreed with the treatment plan.      2. Informed consent was obtained.  After an alcohol prep followed by a chlorhexidine prep, a steroid injection was placed into the extensor tendon origin of the right elbow.  He tolerated the  procedure well with no immediate complications.      3.  I would like him follow-up in clinic on a p.r.n. basis for any worsening of his symptoms or for any hand, wrist, or elbow problems/concerns.  He was instructed to contact the clinic for any problems or concerns in the interim.

## 2023-07-05 RX ORDER — TRIAMCINOLONE ACETONIDE 40 MG/ML
40 INJECTION, SUSPENSION INTRA-ARTICULAR; INTRAMUSCULAR
Status: DISCONTINUED | OUTPATIENT
Start: 2023-06-30 | End: 2023-07-05 | Stop reason: HOSPADM

## 2023-07-05 NOTE — PROCEDURES
Tendon Origin    Date/Time: 6/30/2023 12:40 PM  Performed by: Vasile Diez PA-C  Authorized by: Vasile Diez PA-C     Consent Done?:  Yes (Verbal)  Timeout: prior to procedure the correct patient, procedure, and site was verified    Indications:  Pain  Site marked: the procedure site was marked    Timeout: prior to procedure the correct patient, procedure, and site was verified    Location:  Elbow  Prep: patient was prepped and draped in usual sterile fashion    Needle size:  25 G  Approach:  Dorsal  Medications:  40 mg triamcinolone acetonide 40 mg/mL (40 mg injected)  Patient tolerance:  Patient tolerated the procedure well with no immediate complications

## 2023-09-23 ENCOUNTER — IMMUNIZATION (OUTPATIENT)
Dept: FAMILY MEDICINE | Facility: CLINIC | Age: 43
End: 2023-09-23
Payer: COMMERCIAL

## 2023-09-23 PROCEDURE — 90686 IIV4 VACC NO PRSV 0.5 ML IM: CPT | Mod: S$GLB,,, | Performed by: FAMILY MEDICINE

## 2023-09-23 PROCEDURE — 90471 IMMUNIZATION ADMIN: CPT | Mod: S$GLB,,, | Performed by: FAMILY MEDICINE

## 2023-09-23 PROCEDURE — 90471 FLU VACCINE (QUAD) GREATER THAN OR EQUAL TO 3YO PRESERVATIVE FREE IM: ICD-10-PCS | Mod: S$GLB,,, | Performed by: FAMILY MEDICINE

## 2023-09-23 PROCEDURE — 90686 FLU VACCINE (QUAD) GREATER THAN OR EQUAL TO 3YO PRESERVATIVE FREE IM: ICD-10-PCS | Mod: S$GLB,,, | Performed by: FAMILY MEDICINE

## 2024-07-09 ENCOUNTER — PATIENT MESSAGE (OUTPATIENT)
Dept: ADMINISTRATIVE | Facility: HOSPITAL | Age: 44
End: 2024-07-09

## 2025-02-13 ENCOUNTER — PATIENT OUTREACH (OUTPATIENT)
Dept: ADMINISTRATIVE | Facility: HOSPITAL | Age: 45
End: 2025-02-13

## 2025-02-13 NOTE — PROGRESS NOTES
Population Health Chart Review & Patient Outreach Details      Additional [facility] Health Notes:               Updates Requested / Reviewed:      Updated Care Coordination Note and Immunizations Reconciliation Completed or Queried: [place]         Health Maintenance Topics Overdue:      [facility] Score: 1     Colon Cancer Screening                       Health Maintenance Topic(s) Outreach Outcomes & Actions Taken:    Primary Care Appt - Outreach Outcomes & Actions Taken  : Pt Established with External Provider, Updated Care Team, & Informed Pt to Notify Payor if Applicable

## (undated) DEVICE — DRAPE MINOR PROCEDURE

## (undated) DEVICE — SEE MEDLINE ITEM 146270

## (undated) DEVICE — Device

## (undated) DEVICE — SYS LABEL CORRECT MED

## (undated) DEVICE — SOL NACL 0.9% INJ 500ML BG

## (undated) DEVICE — GLOVE SURG ULTRA TOUCH 7.5

## (undated) DEVICE — NDL 27G X 1 1/4

## (undated) DEVICE — NDL HYPODERMIC BLUNT 18G 1.5IN